# Patient Record
Sex: FEMALE | Race: BLACK OR AFRICAN AMERICAN | Employment: FULL TIME | URBAN - METROPOLITAN AREA
[De-identification: names, ages, dates, MRNs, and addresses within clinical notes are randomized per-mention and may not be internally consistent; named-entity substitution may affect disease eponyms.]

---

## 2017-04-11 ENCOUNTER — OP HISTORICAL/CONVERTED ENCOUNTER (OUTPATIENT)
Dept: OTHER | Age: 52
End: 2017-04-11

## 2017-04-20 ENCOUNTER — OP HISTORICAL/CONVERTED ENCOUNTER (OUTPATIENT)
Dept: OTHER | Age: 52
End: 2017-04-20

## 2017-06-27 ENCOUNTER — ED HISTORICAL/CONVERTED ENCOUNTER (OUTPATIENT)
Dept: OTHER | Age: 52
End: 2017-06-27

## 2017-12-13 ENCOUNTER — OP HISTORICAL/CONVERTED ENCOUNTER (OUTPATIENT)
Dept: OTHER | Age: 52
End: 2017-12-13

## 2018-09-07 ENCOUNTER — OP HISTORICAL/CONVERTED ENCOUNTER (OUTPATIENT)
Dept: OTHER | Age: 53
End: 2018-09-07

## 2019-03-11 ENCOUNTER — ED HISTORICAL/CONVERTED ENCOUNTER (OUTPATIENT)
Dept: OTHER | Age: 54
End: 2019-03-11

## 2019-03-13 ENCOUNTER — OP HISTORICAL/CONVERTED ENCOUNTER (OUTPATIENT)
Dept: OTHER | Age: 54
End: 2019-03-13

## 2019-08-05 ENCOUNTER — ED HISTORICAL/CONVERTED ENCOUNTER (OUTPATIENT)
Dept: OTHER | Age: 54
End: 2019-08-05

## 2019-12-20 ENCOUNTER — OP HISTORICAL/CONVERTED ENCOUNTER (OUTPATIENT)
Dept: OTHER | Age: 54
End: 2019-12-20

## 2020-01-22 ENCOUNTER — ED HISTORICAL/CONVERTED ENCOUNTER (OUTPATIENT)
Dept: OTHER | Age: 55
End: 2020-01-22

## 2020-01-28 ENCOUNTER — OP HISTORICAL/CONVERTED ENCOUNTER (OUTPATIENT)
Dept: OTHER | Age: 55
End: 2020-01-28

## 2020-03-03 ENCOUNTER — OP HISTORICAL/CONVERTED ENCOUNTER (OUTPATIENT)
Dept: OTHER | Age: 55
End: 2020-03-03

## 2020-03-03 ENCOUNTER — ED HISTORICAL/CONVERTED ENCOUNTER (OUTPATIENT)
Dept: OTHER | Age: 55
End: 2020-03-03

## 2020-03-05 ENCOUNTER — OP HISTORICAL/CONVERTED ENCOUNTER (OUTPATIENT)
Dept: OTHER | Age: 55
End: 2020-03-05

## 2020-06-17 ENCOUNTER — OP HISTORICAL/CONVERTED ENCOUNTER (OUTPATIENT)
Dept: OTHER | Age: 55
End: 2020-06-17

## 2020-07-02 ENCOUNTER — OP HISTORICAL/CONVERTED ENCOUNTER (OUTPATIENT)
Dept: OTHER | Age: 55
End: 2020-07-02

## 2020-08-01 ENCOUNTER — OP HISTORICAL/CONVERTED ENCOUNTER (OUTPATIENT)
Dept: OTHER | Age: 55
End: 2020-08-01

## 2020-08-03 ENCOUNTER — OP HISTORICAL/CONVERTED ENCOUNTER (OUTPATIENT)
Dept: OTHER | Age: 55
End: 2020-08-03

## 2020-08-25 ENCOUNTER — HOSPITAL ENCOUNTER (OUTPATIENT)
Dept: GENERAL RADIOLOGY | Age: 55
Discharge: HOME | End: 2020-08-25
Attending: ORTHOPAEDIC SURGERY
Payer: COMMERCIAL

## 2020-08-25 DIAGNOSIS — M75.02 ADHESIVE BURSITIS OF LEFT SHOULDER: ICD-10-CM

## 2020-08-25 PROCEDURE — 74011000250 HC RX REV CODE- 250: Performed by: ORTHOPAEDIC SURGERY

## 2020-08-25 PROCEDURE — 20610 DRAIN/INJ JOINT/BURSA W/O US: CPT

## 2020-08-25 PROCEDURE — 74011250636 HC RX REV CODE- 250/636: Performed by: ORTHOPAEDIC SURGERY

## 2020-08-25 PROCEDURE — 74011636320 HC RX REV CODE- 636/320: Performed by: ORTHOPAEDIC SURGERY

## 2020-08-25 RX ORDER — BUPIVACAINE HYDROCHLORIDE 5 MG/ML
5 INJECTION, SOLUTION EPIDURAL; INTRACAUDAL
Status: COMPLETED | OUTPATIENT
Start: 2020-08-25 | End: 2020-08-25

## 2020-08-25 RX ORDER — LIDOCAINE HYDROCHLORIDE 10 MG/ML
10 INJECTION, SOLUTION EPIDURAL; INFILTRATION; INTRACAUDAL; PERINEURAL
Status: COMPLETED | OUTPATIENT
Start: 2020-08-25 | End: 2020-08-25

## 2020-08-25 RX ORDER — TRIAMCINOLONE ACETONIDE 40 MG/ML
40 INJECTION, SUSPENSION INTRA-ARTICULAR; INTRAMUSCULAR
Status: COMPLETED | OUTPATIENT
Start: 2020-08-25 | End: 2020-08-25

## 2020-08-25 RX ADMIN — TRIAMCINOLONE ACETONIDE 40 MG: 40 INJECTION, SUSPENSION INTRA-ARTICULAR; INTRAMUSCULAR at 14:33

## 2020-08-25 RX ADMIN — BUPIVACAINE HYDROCHLORIDE 25 MG: 5 INJECTION, SOLUTION EPIDURAL; INTRACAUDAL at 14:33

## 2020-08-25 RX ADMIN — IOHEXOL 10 ML: 180 INJECTION INTRAVENOUS at 14:33

## 2020-08-25 RX ADMIN — LIDOCAINE HYDROCHLORIDE 5 ML: 10 INJECTION, SOLUTION EPIDURAL; INFILTRATION; INTRACAUDAL; PERINEURAL at 14:32

## 2020-09-01 ENCOUNTER — OP HISTORICAL/CONVERTED ENCOUNTER (OUTPATIENT)
Dept: OTHER | Age: 55
End: 2020-09-01

## 2020-09-02 ENCOUNTER — ED HISTORICAL/CONVERTED ENCOUNTER (OUTPATIENT)
Dept: OTHER | Age: 55
End: 2020-09-02

## 2020-09-04 VITALS
HEART RATE: 65 BPM | BODY MASS INDEX: 30.8 KG/M2 | OXYGEN SATURATION: 98 % | HEIGHT: 64 IN | RESPIRATION RATE: 18 BRPM | TEMPERATURE: 98.7 F | DIASTOLIC BLOOD PRESSURE: 70 MMHG | WEIGHT: 180.4 LBS | SYSTOLIC BLOOD PRESSURE: 120 MMHG

## 2020-09-04 PROBLEM — H66.90 INFECTION OF EAR: Status: ACTIVE | Noted: 2020-06-12

## 2020-09-04 PROBLEM — J34.9 SINUS PROBLEM: Status: ACTIVE | Noted: 2020-06-12

## 2020-09-04 PROBLEM — H92.09 OTALGIA: Status: ACTIVE | Noted: 2020-06-12

## 2020-09-04 RX ORDER — LOSARTAN POTASSIUM AND HYDROCHLOROTHIAZIDE 12.5; 1 MG/1; MG/1
1 TABLET ORAL DAILY
COMMUNITY
End: 2022-04-04

## 2020-09-04 RX ORDER — AZITHROMYCIN 250 MG/1
250 TABLET, FILM COATED ORAL DAILY
COMMUNITY
End: 2022-04-04

## 2020-09-04 RX ORDER — PREDNISONE 10 MG/1
TABLET ORAL
COMMUNITY
End: 2022-04-04

## 2020-09-04 RX ORDER — IBUPROFEN 800 MG/1
TABLET ORAL
COMMUNITY
End: 2022-04-04

## 2020-09-04 RX ORDER — PANTOPRAZOLE SODIUM 40 MG/1
40 TABLET, DELAYED RELEASE ORAL
COMMUNITY
End: 2022-04-05

## 2020-09-04 RX ORDER — METOPROLOL SUCCINATE 50 MG/1
TABLET, EXTENDED RELEASE ORAL DAILY
Status: ON HOLD | COMMUNITY
End: 2021-03-03 | Stop reason: SDUPTHER

## 2020-09-04 RX ORDER — METOPROLOL SUCCINATE 100 MG/1
100 TABLET, EXTENDED RELEASE ORAL
COMMUNITY

## 2020-09-04 RX ORDER — METHOCARBAMOL 750 MG/1
TABLET, FILM COATED ORAL 4 TIMES DAILY
COMMUNITY
End: 2022-04-04

## 2020-09-04 RX ORDER — LOSARTAN POTASSIUM 100 MG/1
100 TABLET ORAL DAILY
COMMUNITY
End: 2022-04-04

## 2020-09-04 RX ORDER — ALBUTEROL SULFATE 90 UG/1
AEROSOL, METERED RESPIRATORY (INHALATION)
COMMUNITY
End: 2022-04-04

## 2020-09-04 RX ORDER — OLMESARTAN MEDOXOMIL 20 MG/1
20 TABLET ORAL
COMMUNITY

## 2020-09-04 RX ORDER — LANOLIN ALCOHOL/MO/W.PET/CERES
400 CREAM (GRAM) TOPICAL DAILY
COMMUNITY
End: 2022-04-04

## 2020-09-04 RX ORDER — CEFDINIR 300 MG/1
300 CAPSULE ORAL 2 TIMES DAILY
COMMUNITY
End: 2022-04-04

## 2020-09-04 RX ORDER — SPIRONOLACTONE 25 MG/1
25 TABLET ORAL
COMMUNITY

## 2020-10-01 ENCOUNTER — OP HISTORICAL/CONVERTED ENCOUNTER (OUTPATIENT)
Dept: OTHER | Age: 55
End: 2020-10-01

## 2020-12-02 ENCOUNTER — TRANSCRIBE ORDER (OUTPATIENT)
Dept: SCHEDULING | Age: 55
End: 2020-12-02

## 2020-12-02 DIAGNOSIS — I10 BENIGN ESSENTIAL HYPERTENSION: Primary | ICD-10-CM

## 2020-12-02 DIAGNOSIS — I05.9 MITRAL VALVE DISORDER: ICD-10-CM

## 2020-12-02 DIAGNOSIS — R63.5 ABNORMAL WEIGHT GAIN: ICD-10-CM

## 2020-12-02 DIAGNOSIS — R14.0 ABDOMINAL BLOATING: ICD-10-CM

## 2020-12-03 ENCOUNTER — HOSPITAL ENCOUNTER (OUTPATIENT)
Dept: ULTRASOUND IMAGING | Age: 55
Discharge: HOME | End: 2020-12-03
Attending: FAMILY MEDICINE
Payer: COMMERCIAL

## 2020-12-03 DIAGNOSIS — I05.9 MITRAL VALVE DISORDER: ICD-10-CM

## 2020-12-03 DIAGNOSIS — I10 BENIGN ESSENTIAL HYPERTENSION: ICD-10-CM

## 2020-12-03 DIAGNOSIS — R14.0 ABDOMINAL BLOATING: ICD-10-CM

## 2020-12-03 DIAGNOSIS — R63.5 ABNORMAL WEIGHT GAIN: ICD-10-CM

## 2020-12-03 PROCEDURE — 76700 US EXAM ABDOM COMPLETE: CPT

## 2020-12-21 ENCOUNTER — OFFICE VISIT (OUTPATIENT)
Dept: PRIMARY CARE CLINIC | Age: 55
End: 2020-12-21

## 2020-12-21 VITALS — HEART RATE: 70 BPM | OXYGEN SATURATION: 98 % | TEMPERATURE: 98.6 F

## 2020-12-21 DIAGNOSIS — Z13.83 SCREENING FOR CARDIOVASCULAR, RESPIRATORY, AND GENITOURINARY DISEASES: Primary | ICD-10-CM

## 2020-12-21 DIAGNOSIS — Z13.6 SCREENING FOR CARDIOVASCULAR, RESPIRATORY, AND GENITOURINARY DISEASES: Primary | ICD-10-CM

## 2020-12-21 DIAGNOSIS — Z13.89 SCREENING FOR CARDIOVASCULAR, RESPIRATORY, AND GENITOURINARY DISEASES: Primary | ICD-10-CM

## 2020-12-21 DIAGNOSIS — R05.9 COUGH: ICD-10-CM

## 2020-12-21 PROCEDURE — 99202 OFFICE O/P NEW SF 15 MIN: CPT | Performed by: NURSE PRACTITIONER

## 2020-12-21 NOTE — PROGRESS NOTES
Mine Ramachandran is a 63 y.o. female who was seen in clinic today (12/21/2020) for an acute visit. Assessment/Plan:            * COVID-19 sample collected and submitted       * Patient given detailed CDC instructions contained within After Visit Summary    Diagnoses and all orders for this visit:    1. Screening for cardiovascular, respiratory, and genitourinary diseases  -     NOVEL CORONAVIRUS (COVID-19)    2. Cough       Covid like s/s with no known covid exposure. Discussed expected course/resolution/complications of diagnosis in detail with patient. Advised pt on CDC guidance, OTC medications for symptom management, red flags reviewed and should develop to seek emergency medical attn. Encouraged pt to maintain health through a balanced diet, high in fiber and plants;small freq meals if any nausea; staying well hydrated by drinking water or occ low sugar non carbonated beverages; reviewed importance of proper sleep habitus, 7-8hrs of rest; and emphasized moderate exercise 30 mins a day/150mins a week. Reviewed with her that COVID-19 pandemic is an evolving situation with rapidly changing recommendations & guidelines, continue to practice hand hygiene, social distancing, wearing of facial coverings. Regardless of testing results, should still follow CDC guidelines as there is a chance of a false negative, or symptoms may be due to a cold or flu which are also transmissible. Medical decisions are made based on the the best information available at the time. Recommended she stay tuned for updates published by trusted sources and to advise your PCP of any unexpected changes in clinical condition     Subjective:   Darlin Foster was seen today for Concern For COVID-19 (Coronavirus)  [employer] employee. She notes a mild productive cough, has been taking cough syrup with relief. She denies a recent history/current: loss of smell/taste, fever, wheezing, SOB, and BIRCH. Non user of tob.      Travel Screening Question   Response    In the last month, have you been in contact with someone who was confirmed or suspected to have Coronavirus / COVID-19? Yes    Have you had a COVID-19 viral test in the last 14 days? Do you have any of the following new or worsening symptoms? Fever;Cough;Muscle pain    Have you traveled internationally in the last month? No      Travel History   Travel since 11/21/20     No documented travel since 11/21/20          ROS - Pertinent items are noted in HPI    Patient Active Problem List   Diagnosis Code    Infection of ear H66.90    Sinus problem J34.9    Otalgia H92.09     Home Medications    Medication Sig Start Date End Date Taking? Authorizing Provider   albuterol (PROVENTIL HFA, VENTOLIN HFA, PROAIR HFA) 90 mcg/actuation inhaler Take  by inhalation. Provider, Historical   azithromycin (ZITHROMAX) 250 mg tablet Take 250 mg by mouth daily. Provider, Historical   cefdinir (OMNICEF) 300 mg capsule Take 300 mg by mouth two (2) times a day. Provider, Historical   ibuprofen (MOTRIN) 800 mg tablet Take  by mouth. Provider, Historical   losartan (COZAAR) 100 mg tablet Take 100 mg by mouth daily. Provider, Historical   losartan-hydroCHLOROthiazide (HYZAAR) 100-12.5 mg per tablet Take 1 Tab by mouth daily. Provider, Historical   magnesium oxide (MAG-OX) 400 mg tablet Take 400 mg by mouth daily. Provider, Historical   methocarbamoL (ROBAXIN) 750 mg tablet Take  by mouth four (4) times daily. Provider, Historical   METOPROLOL SUCCINATE PO Take  by mouth. Provider, Historical   metoprolol succinate (TOPROL-XL) 100 mg tablet Take  by mouth daily. Provider, Historical   metoprolol succinate (TOPROL-XL) 50 mg XL tablet Take  by mouth daily. Provider, Historical   olmesartan (BENICAR) 20 mg tablet Take 20 mg by mouth daily. Provider, Historical   pantoprazole (PROTONIX) 40 mg tablet Take 40 mg by mouth daily.     Provider, Historical   predniSONE (DELTASONE) 10 mg tablet Take  by mouth daily (with breakfast). Provider, Historical   spironolactone (ALDACTONE) 25 mg tablet Take  by mouth daily. Provider, Historical      Allergies   Allergen Reactions    Erythromycin Base Other (comments)     severe    Hydrochlorothiazide Other (comments)     severe    Penicillin G Other (comments)    Zithromax Z-Joshua [Azithromycin] Other (comments)          Objective:   Physical Exam  General:  alert, cooperative, no distress   Ears: Normal external ear canals AU   Neck: supple, symmetrical, trachea midline. Heart: normal rate, regular rhythm, normal S1, S2, no murmurs, rubs, clicks or gallops. Lungs: clear to auscultation bilaterally       Visit Vitals  Pulse 70   Temp 98.6 °F (37 °C)   SpO2 98%         Disclaimer:        Medication risks/benefits/costs/interactions/alternatives discussed with patient. She was given an after visit summary which includes diagnoses, current medications, & vitals. She expressed understanding with the diagnosis and plan. Aspects of this note may have been generated using voice recognition software. Despite editing, there may be some syntax errors.        Anne Marie Fuentes NP

## 2020-12-22 LAB — SARS-COV-2, NAA: DETECTED

## 2020-12-23 ENCOUNTER — TELEPHONE (OUTPATIENT)
Dept: PRIMARY CARE CLINIC | Age: 55
End: 2020-12-23

## 2020-12-30 ENCOUNTER — OFFICE VISIT (OUTPATIENT)
Dept: PRIMARY CARE CLINIC | Age: 55
End: 2020-12-30
Payer: COMMERCIAL

## 2020-12-30 VITALS — HEART RATE: 78 BPM | OXYGEN SATURATION: 97 % | TEMPERATURE: 98.4 F

## 2020-12-30 DIAGNOSIS — Z86.16 HISTORY OF 2019 NOVEL CORONAVIRUS DISEASE (COVID-19): Primary | ICD-10-CM

## 2020-12-30 DIAGNOSIS — Z76.89 RETURN TO WORK EVALUATION: ICD-10-CM

## 2020-12-30 DIAGNOSIS — R05.9 COUGH: ICD-10-CM

## 2020-12-30 PROCEDURE — 99212 OFFICE O/P EST SF 10 MIN: CPT | Performed by: NURSE PRACTITIONER

## 2020-12-31 NOTE — PROGRESS NOTES
Shannan Ibrahim is a 63 y.o. female who was seen in clinic today (12/30/2020) for an acute visit. Assessment/Plan:            * Patient given detailed CDC instructions contained within After Visit Summary    Diagnoses and all orders for this visit:    1. History of 2019 novel coronavirus disease (COVID-19)    2. Cough    3. Return to work evaluation    Other orders  -     DROPLET PLUS; Standing       known covid-19. Patient has met Centers for Disease Control symptom based criteria for no longer being contagious and ending quarantine. Additionally, she feels able to return to work. Based on my exam, I believe patient may return to work safely. Reviewed with her that COVID-19 pandemic is an evolving situation with rapidly changing recommendations & guidelines, continue to practice hand hygiene, social distancing, wearing of facial coverings; re-infections with covid-19 have been reported although risk remains low. Medical decisions are made based on the the best information available at the time. Recommended she stay tuned for updates published by trusted sources such as the CDC/[facility] websites and to advise your PCP of any unexpected changes in clinical condition     Subjective:   Cristiana Hope was seen today for return to work following lower resp tract infection due to covid-19. Was initially diagnosed with covid 19 on 12/21/2020.,  She denies a recent history/current:, fever, wheezing, SOB, and BIRCH. Feeling mostly back to baseline, would like note to return to work. Travel Screening     Question   Response    In the last month, have you been in contact with someone who was confirmed or suspected to have Coronavirus / COVID-19? Yes    Have you had a COVID-19 viral test in the last 14 days? Yes - Positive result    Do you have any of the following new or worsening symptoms? None of these    Have you traveled internationally in the last month?   No      Travel History   Travel since 11/30/20     No documented travel since 11/30/20          ROS - Pertinent items are noted in HPI    Patient Active Problem List   Diagnosis Code    Infection of ear H66.90    Sinus problem J34.9    Otalgia H92.09     Home Medications    Medication Sig Start Date End Date Taking? Authorizing Provider   albuterol (PROVENTIL HFA, VENTOLIN HFA, PROAIR HFA) 90 mcg/actuation inhaler Take  by inhalation. Provider, Historical   azithromycin (ZITHROMAX) 250 mg tablet Take 250 mg by mouth daily. Provider, Historical   cefdinir (OMNICEF) 300 mg capsule Take 300 mg by mouth two (2) times a day. Provider, Historical   ibuprofen (MOTRIN) 800 mg tablet Take  by mouth. Provider, Historical   losartan (COZAAR) 100 mg tablet Take 100 mg by mouth daily. Provider, Historical   losartan-hydroCHLOROthiazide (HYZAAR) 100-12.5 mg per tablet Take 1 Tab by mouth daily. Provider, Historical   magnesium oxide (MAG-OX) 400 mg tablet Take 400 mg by mouth daily. Provider, Historical   methocarbamoL (ROBAXIN) 750 mg tablet Take  by mouth four (4) times daily. Provider, Historical   METOPROLOL SUCCINATE PO Take  by mouth. Provider, Historical   metoprolol succinate (TOPROL-XL) 100 mg tablet Take  by mouth daily. Provider, Historical   metoprolol succinate (TOPROL-XL) 50 mg XL tablet Take  by mouth daily. Provider, Historical   olmesartan (BENICAR) 20 mg tablet Take 20 mg by mouth daily. Provider, Historical   pantoprazole (PROTONIX) 40 mg tablet Take 40 mg by mouth daily. Provider, Historical   predniSONE (DELTASONE) 10 mg tablet Take  by mouth daily (with breakfast). Provider, Historical   spironolactone (ALDACTONE) 25 mg tablet Take  by mouth daily.     Provider, Historical      Allergies   Allergen Reactions    Erythromycin Base Other (comments)     severe    Hydrochlorothiazide Other (comments)     severe    Penicillin G Other (comments)    Zithromax Z-Joshua [Azithromycin] Other (comments)          Objective: Physical Exam  General:   alert, cooperative, no distress   Ears: Normal external ear canals AU   Neck: supple, symmetrical, trachea midline. Heart: normal rate, regular rhythm   Lungs: clear to auscultation bilaterally       Visit Vitals  Pulse 78   Temp 98.4 °F (36.9 °C)   SpO2 97%         Disclaimer:        Medication risks/benefits/costs/interactions/alternatives discussed with patient. She was given an after visit summary which includes diagnoses, current medications, & vitals. She expressed understanding with the diagnosis and plan. Aspects of this note may have been generated using voice recognition software. Despite editing, there may be some syntax errors.        Frank Sommers NP

## 2021-02-27 ENCOUNTER — HOSPITAL ENCOUNTER (OUTPATIENT)
Dept: LAB | Age: 56
Discharge: HOME | End: 2021-02-27
Payer: COMMERCIAL

## 2021-02-27 ENCOUNTER — TRANSCRIBE ORDER (OUTPATIENT)
Dept: REGISTRATION | Age: 56
End: 2021-02-27

## 2021-02-27 DIAGNOSIS — U07.1 COVID-19: Primary | ICD-10-CM

## 2021-02-27 DIAGNOSIS — U07.1 COVID-19: ICD-10-CM

## 2021-02-27 PROCEDURE — U0003 INFECTIOUS AGENT DETECTION BY NUCLEIC ACID (DNA OR RNA); SEVERE ACUTE RESPIRATORY SYNDROME CORONAVIRUS 2 (SARS-COV-2) (CORONAVIRUS DISEASE [COVID-19]), AMPLIFIED PROBE TECHNIQUE, MAKING USE OF HIGH THROUGHPUT TECHNOLOGIES AS DESCRIBED BY CMS-2020-01-R: HCPCS

## 2021-02-28 LAB — SARS-COV-2, COV2NT: NOT DETECTED

## 2021-03-03 ENCOUNTER — ANESTHESIA (OUTPATIENT)
Dept: ENDOSCOPY | Age: 56
End: 2021-03-03
Payer: COMMERCIAL

## 2021-03-03 ENCOUNTER — ANESTHESIA EVENT (OUTPATIENT)
Dept: ENDOSCOPY | Age: 56
End: 2021-03-03
Payer: COMMERCIAL

## 2021-03-03 ENCOUNTER — HOSPITAL ENCOUNTER (OUTPATIENT)
Age: 56
Setting detail: OUTPATIENT SURGERY
Discharge: HOME | End: 2021-03-03
Attending: INTERNAL MEDICINE | Admitting: INTERNAL MEDICINE
Payer: COMMERCIAL

## 2021-03-03 VITALS
TEMPERATURE: 98.8 F | BODY MASS INDEX: 30.75 KG/M2 | OXYGEN SATURATION: 99 % | HEART RATE: 92 BPM | DIASTOLIC BLOOD PRESSURE: 65 MMHG | HEIGHT: 64 IN | SYSTOLIC BLOOD PRESSURE: 113 MMHG | RESPIRATION RATE: 13 BRPM | WEIGHT: 180.12 LBS

## 2021-03-03 LAB
H PYLORI FROM TISSUE: NEGATIVE
KIT LOT NO., HCLOLOT: NORMAL
NEGATIVE CONTROL: NEGATIVE
POSITIVE CONTROL: POSITIVE

## 2021-03-03 PROCEDURE — 2709999900 HC NON-CHARGEABLE SUPPLY: Performed by: INTERNAL MEDICINE

## 2021-03-03 PROCEDURE — 88305 TISSUE EXAM BY PATHOLOGIST: CPT

## 2021-03-03 PROCEDURE — 76060000031 HC ANESTHESIA FIRST 0.5 HR: Performed by: INTERNAL MEDICINE

## 2021-03-03 PROCEDURE — 76040000019: Performed by: INTERNAL MEDICINE

## 2021-03-03 PROCEDURE — 74011000250 HC RX REV CODE- 250: Performed by: ANESTHESIOLOGY

## 2021-03-03 PROCEDURE — 87077 CULTURE AEROBIC IDENTIFY: CPT | Performed by: INTERNAL MEDICINE

## 2021-03-03 PROCEDURE — 77030013992 HC SNR POLYP ENDOSC BSC -B: Performed by: INTERNAL MEDICINE

## 2021-03-03 PROCEDURE — 74011250636 HC RX REV CODE- 250/636: Performed by: NURSE ANESTHETIST, CERTIFIED REGISTERED

## 2021-03-03 PROCEDURE — 77030021593 HC FCPS BIOP ENDOSC BSC -A: Performed by: INTERNAL MEDICINE

## 2021-03-03 PROCEDURE — 74011000250 HC RX REV CODE- 250: Performed by: NURSE ANESTHETIST, CERTIFIED REGISTERED

## 2021-03-03 PROCEDURE — 74011250636 HC RX REV CODE- 250/636: Performed by: INTERNAL MEDICINE

## 2021-03-03 RX ORDER — DEXTROMETHORPHAN/PSEUDOEPHED 2.5-7.5/.8
1.2 DROPS ORAL
Status: DISCONTINUED | OUTPATIENT
Start: 2021-03-03 | End: 2021-03-03 | Stop reason: HOSPADM

## 2021-03-03 RX ORDER — PROPOFOL 10 MG/ML
INJECTION, EMULSION INTRAVENOUS AS NEEDED
Status: DISCONTINUED | OUTPATIENT
Start: 2021-03-03 | End: 2021-03-03 | Stop reason: HOSPADM

## 2021-03-03 RX ORDER — SODIUM CHLORIDE 9 MG/ML
50 INJECTION, SOLUTION INTRAVENOUS CONTINUOUS
Status: DISCONTINUED | OUTPATIENT
Start: 2021-03-03 | End: 2021-03-03 | Stop reason: HOSPADM

## 2021-03-03 RX ORDER — MIDAZOLAM HYDROCHLORIDE 1 MG/ML
.25-5 INJECTION INTRAMUSCULAR; INTRAVENOUS
Status: DISCONTINUED | OUTPATIENT
Start: 2021-03-03 | End: 2021-03-03 | Stop reason: HOSPADM

## 2021-03-03 RX ORDER — EPINEPHRINE 0.1 MG/ML
1 INJECTION INTRACARDIAC; INTRAVENOUS
Status: DISCONTINUED | OUTPATIENT
Start: 2021-03-03 | End: 2021-03-03 | Stop reason: HOSPADM

## 2021-03-03 RX ORDER — METOPROLOL TARTRATE 5 MG/5ML
5 INJECTION INTRAVENOUS ONCE
Status: COMPLETED | OUTPATIENT
Start: 2021-03-03 | End: 2021-03-03

## 2021-03-03 RX ORDER — PROPOFOL 10 MG/ML
INJECTION, EMULSION INTRAVENOUS
Status: DISCONTINUED | OUTPATIENT
Start: 2021-03-03 | End: 2021-03-03 | Stop reason: HOSPADM

## 2021-03-03 RX ORDER — NALOXONE HYDROCHLORIDE 0.4 MG/ML
0.4 INJECTION, SOLUTION INTRAMUSCULAR; INTRAVENOUS; SUBCUTANEOUS
Status: DISCONTINUED | OUTPATIENT
Start: 2021-03-03 | End: 2021-03-03 | Stop reason: HOSPADM

## 2021-03-03 RX ORDER — LIDOCAINE HYDROCHLORIDE 20 MG/ML
INJECTION, SOLUTION EPIDURAL; INFILTRATION; INTRACAUDAL; PERINEURAL AS NEEDED
Status: DISCONTINUED | OUTPATIENT
Start: 2021-03-03 | End: 2021-03-03 | Stop reason: HOSPADM

## 2021-03-03 RX ORDER — PANTOPRAZOLE SODIUM 40 MG/1
40 TABLET, DELAYED RELEASE ORAL DAILY
Qty: 90 TAB | Refills: 0 | Status: SHIPPED | OUTPATIENT
Start: 2021-03-03 | End: 2021-06-01

## 2021-03-03 RX ORDER — ATROPINE SULFATE 0.1 MG/ML
0.5 INJECTION INTRAVENOUS
Status: DISCONTINUED | OUTPATIENT
Start: 2021-03-03 | End: 2021-03-03 | Stop reason: HOSPADM

## 2021-03-03 RX ORDER — FLUMAZENIL 0.1 MG/ML
0.2 INJECTION INTRAVENOUS
Status: DISCONTINUED | OUTPATIENT
Start: 2021-03-03 | End: 2021-03-03 | Stop reason: HOSPADM

## 2021-03-03 RX ADMIN — PROPOFOL 140 MCG/KG/MIN: 10 INJECTION, EMULSION INTRAVENOUS at 08:21

## 2021-03-03 RX ADMIN — PROPOFOL 30 MG: 10 INJECTION, EMULSION INTRAVENOUS at 08:24

## 2021-03-03 RX ADMIN — PROPOFOL 70 MG: 10 INJECTION, EMULSION INTRAVENOUS at 08:21

## 2021-03-03 RX ADMIN — PROPOFOL 30 MG: 10 INJECTION, EMULSION INTRAVENOUS at 08:22

## 2021-03-03 RX ADMIN — LIDOCAINE HYDROCHLORIDE 20 MG: 20 INJECTION, SOLUTION INTRAVENOUS at 08:21

## 2021-03-03 RX ADMIN — SODIUM CHLORIDE 50 ML/HR: 9 INJECTION, SOLUTION INTRAVENOUS at 08:05

## 2021-03-03 RX ADMIN — METOPROLOL TARTRATE 5 MG: 5 INJECTION INTRAVENOUS at 08:00

## 2021-03-03 NOTE — DISCHARGE INSTRUCTIONS
[address] [facility] [address]  [address] [facility], [address] [facility]  [phone]                   [facility]  [id number]  1965    COLON DISCHARGE INSTRUCTIONS    DISCOMFORT:  Redness at IV site- apply warm compress to area; if redness or soreness persist- contact your physician  There may be a slight amount of blood passed from the rectum  Gaseous discomfort- walking, belching will help relieve any discomfort  You may not operate a vehicle for 12 hours  You may not  engage in an occupation involving machinery or appliances for rest of today  You may not  drink alcoholic beverages for at least 12 hours  Avoid making any critical decisions for at least 24 hour    DIET:   High fiber diet. - however -  remember your colon is empty and a heavy meal will produce gas. Avoid these foods:  vegetables, fried / greasy foods, carbonated drinks for today     ACTIVITY:  It is recommended that you spend the remainder of the day resting -  avoid any strenuous activity. CALL M.D. ANY SIGN OF:   Increasing pain, nausea, vomiting  Abdominal distension (swelling)  New increased bleeding (oral or rectal)  Fever (chills)  Pain in chest area  Bloody discharge from nose or mouth  Shortness of breath    You may resume your medications    Post procedure diagnosis:    Hiatal hernia  Esophagitis  descending colon and sigmoid polyps  hemorrhoids    Follow-up Instructions:    If a specimen was collected, you will receive a letter with the result by mail within two  weeks. Depending on the result this letter will specify your follow up colonoscopy date.       Please call us for any questions or concerns                     Kandace Hudson  [id number]  1965        DISCHARGE SUMMARY from Nurse    The following personal items collected during your admission are returned to you:   Dental Appliance: Dental Appliances: None  Vision: Visual Aid: None  Hearing Aid:    Jewelry:    Clothing:    Other Valuables: Valuables sent to safe:

## 2021-03-03 NOTE — PERIOP NOTES
Endoscopy discharge instructions have been reviewed and given to patient. The patient verbalized understanding and acceptance of instructions. Dr. Kelsi Gauthier discussed with patient and caregiver procedure findings and next steps.

## 2021-03-03 NOTE — PERIOP NOTES
Patient HR was elevated during pre-admission between 115-120s, Dr. Shelton Barnes (anesthesiologist), spoke with the patient, per the patient she did not take her metoprolol this morning and she was nervous, on why she believes her HR was elevated. Dr. Shelton Barnes ordered for the patient to get 5 mg of Metoprolol. Patient received 5 mg at 0800. HR was 112, and /78 at 0800. At 0810, patient HR came down to 98 and /79. Dr. Shelton Barnes is aware of the patients current heart rate. Will continue to monitor.

## 2021-03-03 NOTE — PROCEDURES
[address]  [address] [facility], [address] [facility]       [phone]                   3/3/2021                EGD Operative Report  Kandace Hudson  :  1965  Duy Mccloud Medical Record Number:  [id number]      Indication:  Abdominal pain, epigastric, GERD     : Gio Mary MD    Assistants: None    Referring Provider:  Aranza Connolly MD      Anesthesia/Sedation:  MAC anesthesia Propofol    Airway assessment: No airway problems anticipated    Pre-Procedural Exam:      Airway: clear, no airway problems anticipated  Heart: RRR, without gallops or rubs  Lungs: clear bilaterally without wheezes, crackles, or rhonchi  Abdomen: soft, nontender, nondistended, bowel sounds present  Mental Status: awake, alert and oriented to person, place and time       Procedure Details     After infomed consent was obtained for the procedure, with all risks and benefits of procedure explained the patient was taken to the endoscopy suite and placed in the left lateral decubitus position. Following sequential administration of sedation as per above, the endoscope was inserted into the mouth and advanced under direct vision to second portion of the duodenum. A careful inspection was made as the gastroscope was withdrawn, including a retroflexed view of the proximal stomach; findings and interventions are described below. Findings:   Esophagus: Mild grade A reflux type esophagitis. normal rest of the examined esophagus. Random bx taken. Stomach: medium size sliding hiatal hernia ( about 2-3 cm ). Otherwise stomach is normal. JOEL test performed   Duodenum/jejunum: normal mucosa. Bx taken for histology    Therapies:  biopsy of esophagus  biopsy of stomach body, antrum  biopsy of duodenal random    Specimens: none    Implants: none           Complications:   None; patient tolerated the procedure well. EBL:  None.            Impression:      Hiatal hernia  Grade A Esophagitis    Recommendations:    -Acid suppression with a proton pump inhibitor. ,   -Await JOEL test result and treat for Helicobacter pylori if positive. ,   -Follow symptoms and call for pathology results in 1 week  -GERD diet: avoid fried and fatty foods.  peppermint, chocolate, alcohol, coffee, citrus fruits and juices, tomoato products; avoid lying down for 2 to 3 hours after eating.,   -No NSAIDS  -Colonoscopy today    Christa Marmolejo MD

## 2021-03-03 NOTE — ANESTHESIA POSTPROCEDURE EVALUATION
Procedure(s):  ESOPHAGOGASTRODUODENOSCOPY (EGD)  COLONOSCOPY  ESOPHAGOGASTRODUODENAL (EGD) BIOPSY  ENDOSCOPIC POLYPECTOMY. MAC    Anesthesia Post Evaluation        Patient location during evaluation: PACU  Level of consciousness: awake  Pain management: adequate  Airway patency: patent  Anesthetic complications: no  Cardiovascular status: acceptable  Respiratory status: acceptable  Hydration status: acceptable  Post anesthesia nausea and vomiting:  none      INITIAL Post-op Vital signs:   Vitals Value Taken Time   /74 03/03/21 0851   Temp 37.1 °C (98.8 °F) 03/03/21 0848   Pulse 92 03/03/21 0854   Resp 18 03/03/21 0854   SpO2 100 % 03/03/21 0855   Vitals shown include unvalidated device data.

## 2021-03-03 NOTE — ANESTHESIA PREPROCEDURE EVALUATION
Relevant Problems   No relevant active problems       Anesthetic History   No history of anesthetic complications            Review of Systems / Medical History  Patient summary reviewed, nursing notes reviewed and pertinent labs reviewed    Pulmonary  Within defined limits                 Neuro/Psych   Within defined limits           Cardiovascular    Hypertension                Comments: Mitral valve prolapse   GI/Hepatic/Renal     GERD           Endo/Other  Within defined limits           Other Findings              Physical Exam    Airway  Mallampati: I  TM Distance: 4 - 6 cm  Neck ROM: normal range of motion   Mouth opening: Normal     Cardiovascular    Rhythm: regular  Rate: abnormal         Dental    Dentition: Lower dentition intact and Upper dentition intact     Pulmonary  Breath sounds clear to auscultation               Abdominal         Other Findings            Anesthetic Plan    ASA: 2  Anesthesia type: MAC          Induction: Intravenous  Anesthetic plan and risks discussed with: Patient      Tachycardic this morning, HR around 140, she appears anxious and also did not take her am dose of metoprolol. Will start with iv hydration and intravenous metoprolol in attempt to bring heart rate down.

## 2021-03-03 NOTE — PROCEDURES
[address] [facility] [address]  [address] [facility], [address] [facility]  [phone]                   Colonoscopy Operative Report      Indications:    Screening colonoscopy     :  Angelica Eubanks MD    Assistants: None    Referring Provider: Luís Galvez MD    Sedation:  MAC anesthesia Propofol    Procedure Details:  After informed consent was obtained with all risks and benefits of procedure explained and preoperative exam completed, the patient was taken to the endoscopy suite and placed in the left lateral decubitus position. Upon sequential sedation as per above, a digital rectal exam was performed  And was normal.  The Olympus videocolonoscope  was inserted in the rectum and carefully advanced to the cecum, which was identified by the ileocecal valve and appendiceal orifice, terminal ileum. The quality of preparation was excellent. The colonoscope was slowly withdrawn with careful evaluation between folds. Retroflexion in the rectum was performed and was normal..     Findings:   Rectum: Grade 1 internal hemorrhoid(s); Sigmoid: 1  Sessile polyp(s), the largest 3 mm in size; Descending Colon: 1  Sessile polyp(s), the largest 4 mm in size;  Transverse Colon: normal  Ascending Colon: normal  Cecum: normal  Terminal Ileum: normal    Interventions:  2 complete polypectomy were performed using cold biopsy forceps and the polyps were  retrieved    Specimen Removed:  specimen #1, 3 mm in size, located in the descending colon and the sigmoid removed by cold biopsy and sent for pathology    Implants: none    Complications: None. EBL:  None. Impression:        Diminutive descending colon and sigmoid polyps ( removed)   Grade 1 Internal Hemorrhoids        Recommendations:   -Await pathology.   -If adenoma is present, repeat colonoscopy in 5 years.  -High fiber diet.     -Resume normal medication(s)  -Hemorrhoid education handout    Discharge Disposition:  Home in the company of a  when able to ambulate.     Gio Mary MD  3/3/2021  8:44 AM

## 2021-03-03 NOTE — PERIOP NOTES
Received Report From Miguel Bob CRNA, @ 7787, see anesthesia notes. Care of the patient transferred to procedure nurse Brigida Melchor, BEATRIZ @ 7624    Out of Procedure and sent to post-recovery @ 3510    Post-recovery report given to Germán Paulson RN @ 4533    Patient ABD remains soft and non-tender post procedure. Pt has no complaints at this time and tolerated the procedure well. Endoscope was pre-cleaned at bedside immediately following procedure by Richy Stevenson.

## 2021-03-03 NOTE — PERIOP NOTES
German Shed  1965  [id number]        Situation:  Verbal report received from: Lucy Parsons RN. Procedure: Procedure(s):  ESOPHAGOGASTRODUODENOSCOPY (EGD)  COLONOSCOPY  ESOPHAGOGASTRODUODENAL (EGD) BIOPSY  ENDOSCOPIC POLYPECTOMY    Background:    Preoperative diagnosis: ABDOMINAL PAIN, SCREENING  Postoperative diagnosis:   Hiatal hernia  Esophagitis  descending colon and sigmoid polyps  hemorrhoids    :  Dr. Jones Crocker  Assistant(s): Endoscopy Technician-1: Arnold Sim  Endoscopy RN-1: Ellen Lala RN    Specimens:   ID Type Source Tests Collected by Time Destination   1 : duodenal biopsy Preservative Duodenum  Kasey Alberto MD 3/3/2021 0825 Pathology   2 : Random esophagus biopsy Preservative   Kasey Alberto MD 3/3/2021 0825 Pathology   3 : descending colon and sigmoid polyps Preservative   Kasey Alberto MD 3/3/2021 1524 Pathology     H. Pylori  yes      Anesthesia gave intra-procedure sedation and medications, see anesthesia flow sheet yes    Intravenous fluids: NS@ KVO     Vital signs stable yes    Abdominal assessment: round and soft yes     Recommendation:  Discharge patient per MD Susanna Field .   \Family or Friend - Irena Mary   Permission to share finding with family or friend yes

## 2021-03-03 NOTE — H&P
[address] [place] [address]  [facility], [address] [facility]  [phone]                     History and Physical     NAME: Nguyen Pierce   :  1965   MRN:  [id number]     HPI:  63year old female who presents with a complaint of Abdominal Pain. The patient presents for consultation at the request of Dr. Edwin Song). The onset of the abdominal pain has been chronic and has been occurring in an intermittent pattern for months with a recurrent course . The abdominal pain is described as moderate burning and pressure sensation and  is described as being located in the right upper quadrant .  The abdominal pain radiates to the right upper quadrant. The symptoms are aggravated by meals (1/2 to 1 hour after eating). The symptoms are relieved by fasting. The symptoms have been associated with bloating and chest pain,  while the symptoms have not been associated with bloody stools, black stools, constipation, diarrhea, family history of colon cancer, heartburn, hematuria, jaundice, melena, nausea, passing worms, use of alcohol, vomiting or weight loss. There is a surgical history of cholecystectomy and hysterectomy. Previous diagnostic tests have included ultrasound (normal within weeks) and Liver function tests (mildldy elevated ALT. Fatty liver foudn on US TP is s/p choelcystectomy), but not colonoscopy, EGD, CT scan or Gastric emptying study. The patient has been using omeprazole (Prilosec). Current medication use:  not considered effective by patient. The abdominal pain is characterized as not well controlled. Lab results show: CBC normal and LFT's (ALT mildly elevated. ). Additional reasons for visit:    Screening Colonoscopy is described as the following:   There has been no associated family history of colon cancer, change in bowel habits, change in caliber of stools, melena, hematochezia, weight loss, anorexia, diarrhea, constipation, fever, feeling tired, abdominal pain or family history of colon polyps. The frequency of bowel movements has been 1 per day and The stools are of normal consistency. Lab results: CBC normal. Previous diagnostic tests have not included colonoscopy or CT scan. Past Surgical History:   Procedure Laterality Date    HX BREAST RECONSTRUCTION      implants    HX CHOLECYSTECTOMY      HX HYSTERECTOMY      still has ovaries    [place] BREAST SURGERY PROCEDURE UNLISTED      fibrominoma removed     Past Medical History:   Diagnosis Date    Fatty liver     GERD (gastroesophageal reflux disease)     Hypertension     Infection of ear 6/12/2020    Mitral valve prolapse     Otalgia 6/12/2020    Sinus problem 9/4/2020     Social History     Tobacco Use    Smoking status: Never Smoker    Smokeless tobacco: Never Used   Substance Use Topics    Alcohol use: Not Currently    Drug use: Not on file     Allergies   Allergen Reactions    Erythromycin Base Other (comments)     severe    Hydrochlorothiazide Other (comments)     severe    Lisinopril Rash    Morphine Unknown (comments)    Penicillin G Other (comments)    Zithromax Z-Joshua [Azithromycin] Other (comments)     History reviewed. No pertinent family history. No current facility-administered medications for this encounter. PHYSICAL EXAM:  General: WD, WN. Alert, cooperative, no acute distress    HEENT: NC, Atraumatic. PERRLA, EOMI. Anicteric sclerae. Lungs:  CTA Bilaterally. No Wheezing/Rhonchi/Rales. Heart:  Regular  rhythm,  No murmur, No Rubs, No Gallops  Abdomen: Soft, Non distended, Non tender.  +Bowel sounds, no HSM  Extremities: No c/c/e  Neurologic:  CN 2-12 gi, Alert and oriented X 3. No acute neurological distress   Psych:   Good insight. Not anxious nor agitated.            Assessment:   I have reviewed with the patient +/- family alternatives,benefits and risks for the procedure, as well as potential complications(with emphasis on, but not limited to, bleeding, perforation, cardiovascular/cerebrovascular/pulmonary events, reactions to the medications, infection, risk of missing a lesions/a cancer, and the imponderables including death), alternative options, and patient/family voices understanding.       Plan:   · Endoscopic procedure  · Conscious sedation or MAC

## 2021-09-02 ENCOUNTER — TRANSCRIBE ORDER (OUTPATIENT)
Dept: SCHEDULING | Age: 56
End: 2021-09-02

## 2021-09-02 DIAGNOSIS — Z12.31 VISIT FOR SCREENING MAMMOGRAM: Primary | ICD-10-CM

## 2021-10-01 ENCOUNTER — TRANSCRIBE ORDER (OUTPATIENT)
Dept: SCHEDULING | Age: 56
End: 2021-10-01

## 2021-10-01 DIAGNOSIS — Z12.31 SCREENING MAMMOGRAM FOR HIGH-RISK PATIENT: Primary | ICD-10-CM

## 2022-01-04 ENCOUNTER — HOSPITAL ENCOUNTER (OUTPATIENT)
Dept: PHYSICAL THERAPY | Age: 57
Discharge: HOME | End: 2022-01-04
Payer: COMMERCIAL

## 2022-01-04 PROCEDURE — 97162 PT EVAL MOD COMPLEX 30 MIN: CPT

## 2022-01-04 PROCEDURE — 97110 THERAPEUTIC EXERCISES: CPT

## 2022-01-04 NOTE — PROGRESS NOTES
[address] [address] [facility] [address] Suite [facility], [address] [place] [address]  Ph: [phone]    Fax: [phone]    Initial Evaluation/Plan of Care/Statement of Necessity for Physical Therapy Services     Patient name: Kiarra Laboy    Date/Start of Care:2022  : 1965  [x]  Patient  Verified  Provider#: [id number]          Referral source: [place] Patient, *    Return visit to MD: in two months      Medical/Treatment Diagnosis: Pain in right shoulder [M25.511]  Impingement syndrome of right shoulder [M75.41]  Other shoulder lesions, right shoulder [M75.81]  Impingement syndrome of left shoulder [M75.42]  Other shoulder lesions, left shoulder [M75.82]  Pain in left shoulder [M25.512]    Payor: [employer] (formerly [employer]) / Plan: [facility] PPO / Product Type: PPO /       Prior Hospitalization: see medical history     Comorbidities: see intake summary   Prior Level of Function: Indep   Medications: Verified on Patient Summary List          Patient / Family readiness to learn indicated by: asking questions, trying to perform skills and interest  Persons(s) to be included in education: patient (P)  Barriers to Learning/Limitations: None  Patient Self Reported Health Status: good  Rehabilitation Potential: good  Previous Treatment/Compliance: yes   PMHx/Surgical Hx: see intake summary   Work Hx: Pt works as a Mammographer supervisor, desk job   Living Situation: Pt lives with daughter in a two story home   Barriers to progress: none   Motivation: good   Substance use: none   Cognition: A & O x 4  Onset Date: 21    SUBJECTIVE  Mechanism of Injury: Gradual onset of shoulder pain about 4-5 months ago. Pt states both shoulders started hurting around the same time but the R shoulder was more intense. Pt states the whole R shoulder girdle started to hurt so she went to the doctor.   She had an injection about two months ago in the R shoulder which helped with the muscle soreness. Pain still comes and goes in both shoulder, R>L. Area of pain: Bilat shoulder pain (R>L)    Pain Descriptors: Throbbing pain    Pain Level (0-10 scale)  At rest: 0 With activity: 8  Worst: 8   Least: 0  Things that worsen pain: Reaching away from body, out to side, overhead and behind the back. Things that ease pain: rest, ice   Objective/Functional Measures including ROM/MMT:   Physical Findings   Ortho:   Posture: Rounded shoulders with slight R scapula ant tipping. Palpation: TTP R pecs, subacromial space and humeral head. Decrease GH posterior mobility   Gross findings:  Poor postural control     Specific joints: *normal values in ()    SHOULDER                                         AROM   PROM            MMT   R L R L R L   Flexion (180) 133 P! 145   4 4+   Extension (60) 52 65       Abduction (180) 110 P! 135   4 P!  4+   Adduction (0)         IR (70) L2 P! T8    4+ 4+   ER (90) T2 P!  T3    4 4+   Horizontal Abduction (130)         Horizontal Adduction (45)         Additional comments: painful arc on L at 130-140 deg flexion    ELBOW                             AROM                             PROM                             MMT   R L R L R L   Flexion (150) WFL  WFL       Extension (0) [facility]  WFL        Additional comments: no pain       SPINE:   CERVICAL                                                ROM                                  Strength   Flexion  WFL    Extension WFL    Protraction     Retraction        R L R L   Lateral Flexion (45) WFL WFL     Rotation (90) WFL WFL     Additional comments: cervical ROM WFL without pain     Strength: good, non-painful   Special Tests: +impingement sign on R, +R empty can, +painful arc of motion on L, +R javed-meera test   [facility] Score:   24.91%  ASSESSMENT/Changes in Function:   Pt referred to PT services with shanika shoulder pain (R>L).   Pt presents with S&S associated with secondary impingement due to poor postural control, soft tissue tightness and decrease scapula stabilization. Pt will benefit from skilled PT services to assist in pain reduction. Problem List/Impairments: pain affecting function, decrease ROM, decrease strength, decrease activity tolerance and decrease flexibility/ joint mobility  Treatment Plan may include any combination of the following: Therapeutic exercise, Physical agent/modality, Manual therapy and Patient education  Patient/ Caregiver education and instruction: exercises  Frequency / Duration: Patient to be seen 2 times per week for 12 treatments. Certification Period: 1/4/22 - 4/4/22  Patient Goal (s): Uriah Arora    [] Met [] Not met [] Partially met   Short Term Goals: To be accomplished in 6 treatments. 1.  Indep with a HEP to assist in rehab progression. [] Met [] Not met [] Partially met    2. >150 deg bilat shoulder flexion ROM to improve overhead reach. [] Met [] Not met [] Partially met    3. >120 deg R shoulder abduction ROM to improve reaching out away from the body. [] Met [] Not met [] Partially met    4. Improvement in IR ROM behind the back to manage clothing behind the back. [] Met [] Not met [] Partially met    5. Max R shoulder pain 5/10 to improve overall activity tolerance. [] Met [] Not met [] Partially met    Long Term Goals: To be accomplished in 12 treatments. 1.  Pt will be able to reach overhead to retrieve objects without bilat shoulder pain. [] Met [] Not met [] Partially met    2. Pt will be able to sleep on her sides without shoulder pain to improve her quality of sleep. [] Met [] Not met [] Partially met    3. Pt will show no signs of impingement to improve ability to reach in different planes of motion without shoulder pain.  [] Met [] Not met [] Partially met    TODAY'S TREATMENT: EVALUATION completed   Visit #: 1  In time:3:20pm  Out time: 4:00pm  Total Treatment Time (min): 40   Total Timed Codes (min): 15 1:1 Treatment Time ([facility] only): 15   Pain Level (0-10 scale) pre treatment: 0   Pain Level (0-10 scale) post treatment: 0  15 min Therapeutic Exercise:  [x] See flow sheet :   Rationale: increase ROM and improve posture  to improve the patients ability to reach without shoulder pain   With   [x] TE   [] TA   [] Neuro   [] SC   [] other: Patient Education: [x] Review HEP    [] Progressed/Changed HEP based on:   [] positioning   [] body mechanics   [] transfers   [x] heat/ice application    [x] other: discussed POC        [x]  Plan of care has been reviewed with PTA. The Plan of Care is based on information from the initial evaluation. Aye Javed, PT, DPT 1/4/2022   ________________________________________________________________________    I certify that the above Therapy Services are being furnished while the patient is under my care. I agree with the treatment plan and certify that this therapy is necessary.     [de-identified] Signature:_________________________________________________  Date:____________Time: ____________     Renetta Jesus, *

## 2022-01-06 ENCOUNTER — HOSPITAL ENCOUNTER (OUTPATIENT)
Dept: PHYSICAL THERAPY | Age: 57
Discharge: HOME | End: 2022-01-06
Payer: COMMERCIAL

## 2022-01-06 PROCEDURE — 97110 THERAPEUTIC EXERCISES: CPT

## 2022-01-06 NOTE — PROGRESS NOTES
PT DAILY TREATMENT NOTE - [facility]     Patient Name: Xin Reading  Date:2022  : 1965  [x]  Patient  Verified  Payor: Janice  / Plan: [employer] [facility] [address] / Product Type: PPO /    Treatment Area: Pain in right shoulder [M25.511]  Impingement syndrome of right shoulder [M75.41]  Other shoulder lesions, right shoulder [M75.81]  Impingement syndrome of left shoulder [M75.42]  Other shoulder lesions, left shoulder [M75.82]  Pain in left shoulder [M25.512]   Next MD APPT:  In time: 4:22   Out time: 5:00  Total Treatment Time (min):38  Total Timed Codes (min): 38  1:1 Treatment Time ( [address] only): 38   Visit #:   Visit count could not be calculated. Make sure you are using a visit which is associated with an episode. SUBJECTIVE  Pain Level (0-10 scale) pre treatment:  0/10  Pain Level (0-10 scale) post treatment: 0/10  Any medication changes, allergies to medications, adverse drug reactions, diagnosis change, or new procedure performed?:   [] No    [] Yes (see summary sheet for update)  Subjective functional status/changes:   [] No changes reported  Reports no pain today just soreness and occasional pain with certain movements if she trys to reach for something behind her. Stated she's been doing the exercises, using a heating pad  and reports some discomfort on R scapula and being TTP at Pectoralis muscles. OBJECTIVE  Decline modalities today      38 min Therapeutic Exercise:  [] See flow sheet :   Rationale: increase ROM, increase strength, improve coordination, improve balance and increase proprioception to improve the patients ability to reduce shoulder pain elevation.        With   [x] TE   [] TA   [] Neuro   [] SC   [] other: Patient Education: [] Review HEP    [] Progressed/Changed HEP based on:   [] positioning   [] body mechanics   [] transfers   [] Use of heat/ice    [] other:          Other Objective/Functional Measures:   POC was continues   - Patient reports some discomfort and arm pain on L>R with isometrics and IR/ER AROM in side lying. ASSESSMENT/Changes in Function:   Patient tolerated session well, appears to have more discomfort on L>R shoulder during exercises, reports to be compliant with exercises and was given HEP with isometrics. Reports tightness with post capsule stretch. Reports no pain post session. Patient will continue to benefit from skilled PT services to modify and progress therapeutic interventions, address functional mobility deficits, address ROM deficits, address strength deficits, analyze and address soft tissue restrictions, analyze and cue movement patterns, analyze and modify body mechanics/ergonomics and assess and modify postural abnormalities to attain remaining goals. GOALS/Progress towards goals:  Short Term Goals: To be accomplished in 6 treatments. 1.  Indep with a HEP to assist in rehab progression. []? Met []? Not met []? Partially met    2. >150 deg bilat shoulder flexion ROM to improve overhead reach. []? Met []? Not met []? Partially met    3. >120 deg R shoulder abduction ROM to improve reaching out away from the body. []? Met []? Not met []? Partially met    4. Improvement in IR ROM behind the back to manage clothing behind the back. []? Met []? Not met []? Partially met    5. Max R shoulder pain 5/10 to improve overall activity tolerance. []? Met []? Not met []? Partially met      Long Term Goals: To be accomplished in 12 treatments. 1.  Pt will be able to reach overhead to retrieve objects without bilat shoulder pain. []? Met []? Not met []? Partially met    2. Pt will be able to sleep on her sides without shoulder pain to improve her quality of sleep. []? Met []? Not met []? Partially met    3. Pt will show no signs of impingement to improve ability to reach in different planes of motion without shoulder pain. []? Met []? Not met []?  Partially met      PLAN  [x]  Upgrade activities as tolerated     [x]  Continue plan of care  []  Update interventions per flow sheet       []  Discharge due to:_  []  Other:_      Toña Chváez, PT, DPT 1/6/2022

## 2022-01-10 ENCOUNTER — HOSPITAL ENCOUNTER (OUTPATIENT)
Dept: PHYSICAL THERAPY | Age: 57
Discharge: HOME | End: 2022-01-10
Payer: COMMERCIAL

## 2022-01-10 PROCEDURE — 97110 THERAPEUTIC EXERCISES: CPT | Performed by: PHYSICAL THERAPIST

## 2022-01-10 PROCEDURE — 97140 MANUAL THERAPY 1/> REGIONS: CPT | Performed by: PHYSICAL THERAPIST

## 2022-01-10 NOTE — PROGRESS NOTES
PT DAILY TREATMENT NOTE - [facility]     Patient Name: Lydia Norton  Date:1/10/2022  : 1965  [x]  Patient  Verified  Payor: Chanel Farrell / Plan: [employer] [facility] [address] / Product Type: PPO /    Treatment Area: Pain in right shoulder [M25.511]  Impingement syndrome of right shoulder [M75.41]  Other shoulder lesions, right shoulder [M75.81]  Impingement syndrome of left shoulder [M75.42]  Other shoulder lesions, left shoulder [M75.82]  Pain in left shoulder [M25.512]   Next MD APPT:  In time: 245pm  Out time: 325 pm  Total Treatment Time (min): 40  Total Timed Codes (min): 40  1:1 Treatment Time ( only): 40  Visit #: 3/12  Visit count could not be calculated. Make sure you are using a visit which is associated with an episode. SUBJECTIVE  Pain Level (0-10 scale) pre treatment:  0/10  Pain Level (0-10 scale) post treatment: 0/10  Any medication changes, allergies to medications, adverse drug reactions, diagnosis change, or new procedure performed?:   [] No    [] Yes (see summary sheet for update)  Subjective functional status/changes:   [] No changes reported  Reports her shoulder feel pretty good unless she moves the wrong way. OBJECTIVE  Decline modalities today    10 min Manual Therapy: inferior and posterior GH joint mobilizations, PROM into ER and flexion, STM R UT and LS   Rationale: decrease pain, increase ROM, increase tissue extensibility and decrease trigger points to improve the patients ability to reduce pain with ADLs    30 min Therapeutic Exercise:  [x] See flow sheet :   Rationale: increase ROM, increase strength, improve coordination, improve balance and increase proprioception to improve the patients ability to reduce shoulder pain elevation.        With   [x] TE   [] TA   [] Neuro   [] SC   [] other: Patient Education: [x] Review HEP    [] Progressed/Changed HEP based on:   [] positioning   [] body mechanics   [] transfers   [] Use of heat/ice    [] other:          Other Objective/Functional Measures: Improved PROM following GH joint mobilizations      ASSESSMENT/Changes in Function:   Patient tolerated session well, requiring cueing to properly set scapula with exercises and to avoid elevation. Patient will continue to benefit from skilled PT services to modify and progress therapeutic interventions, address functional mobility deficits, address ROM deficits, address strength deficits, analyze and address soft tissue restrictions, analyze and cue movement patterns, analyze and modify body mechanics/ergonomics and assess and modify postural abnormalities to attain remaining goals. GOALS/Progress towards goals:  Short Term Goals: To be accomplished in 6 treatments. 1.  Indep with a HEP to assist in rehab progression. [x]? Met []? Not met []? Partially met    2. >150 deg bilat shoulder flexion ROM to improve overhead reach. []? Met []? Not met [x]? Partially met    3. >120 deg R shoulder abduction ROM to improve reaching out away from the body. []? Met []? Not met [x]? Partially met    4. Improvement in IR ROM behind the back to manage clothing behind the back. []? Met []? Not met []? Partially met    5. Max R shoulder pain 5/10 to improve overall activity tolerance. []? Met []? Not met [x]? Partially met      Long Term Goals: To be accomplished in 12 treatments. 1.  Pt will be able to reach overhead to retrieve objects without bilat shoulder pain. []? Met []? Not met []? Partially met    2. Pt will be able to sleep on her sides without shoulder pain to improve her quality of sleep. []? Met []? Not met []? Partially met    3. Pt will show no signs of impingement to improve ability to reach in different planes of motion without shoulder pain. []? Met []? Not met []?  Partially met      PLAN  [x]  Upgrade activities as tolerated     [x]  Continue plan of care  []  Update interventions per flow sheet       []  Discharge due to:_  []  Other:_      Jose Castro PT 1/10/2022

## 2022-01-13 ENCOUNTER — HOSPITAL ENCOUNTER (OUTPATIENT)
Dept: PHYSICAL THERAPY | Age: 57
Discharge: HOME | End: 2022-01-13
Payer: COMMERCIAL

## 2022-01-13 PROCEDURE — 97110 THERAPEUTIC EXERCISES: CPT

## 2022-01-13 PROCEDURE — 97140 MANUAL THERAPY 1/> REGIONS: CPT

## 2022-01-13 NOTE — PROGRESS NOTES
PT DAILY TREATMENT NOTE - [facility]     Patient Name: Delon Yan  Date:2022  : 1965  [x]  Patient  Verified  Payor: Williams Casanova / Plan: BS\[facility]\"" EU[facility] [address] / Product Type: PPO /    Treatment Area: Pain in right shoulder [M25.511]  Impingement syndrome of right shoulder [M75.41]  Other shoulder lesions, right shoulder [M75.81]  Impingement syndrome of left shoulder [M75.42]  Other shoulder lesions, left shoulder [M75.82]  Pain in left shoulder [M25.512]   Next MD APPT:  In time: 3:47pm  Out time: 4:30pm  Total Treatment Time (min): 43  Total Timed Codes (min): 43  1:1 Treatment Time ( only): 43  Visit #:       SUBJECTIVE  Pain Level (0-10 scale) pre treatment:  0/10  Pain Level (0-10 scale) post treatment: 0/10  Any medication changes, allergies to medications, adverse drug reactions, diagnosis change, or new procedure performed?:   [] No    [] Yes (see summary sheet for update)  Subjective functional status/changes:   [] No changes reported  Pt states she is doing well just feel a little pain when moving certain ways. OBJECTIVE  Decline modalities today    10 min Manual Therapy: inferior and posterior GH joint mobilizations, PROM into ER and flexion, STM R UT and LS   Rationale: decrease pain, increase ROM, increase tissue extensibility and decrease trigger points to improve the patients ability to reduce pain with ADLs    33 min Therapeutic Exercise:  [x] See flow sheet :   Rationale: increase ROM, increase strength, improve coordination, improve balance and increase proprioception to improve the patients ability to reduce shoulder pain elevation. With   [x] TE   [] TA   [] Neuro   [] SC   [] other: Patient Education: [x] Review HEP    [] Progressed/Changed HEP based on:   [] positioning   [] body mechanics   [] transfers   [] Use of heat/ice    [] other:          Other Objective/Functional Measures:  Added elbow press behind head, ball roll on wall, cane shoulder flexions. ASSESSMENT/Changes in Function:   Pt continues to have soft tissue tightness along the R side neck and lateral border of the scapula. Cues to correct posture during theraband exercises. Occasional pain felt during ROM. Will continue to work on postural re-education and shoulder/scapula stabilization as tolerated. Progressing well at this time. Patient will continue to benefit from skilled PT services to modify and progress therapeutic interventions, address functional mobility deficits, address ROM deficits, address strength deficits, analyze and address soft tissue restrictions, analyze and cue movement patterns, analyze and modify body mechanics/ergonomics and assess and modify postural abnormalities to attain remaining goals. GOALS/Progress towards goals:  Short Term Goals: To be accomplished in 6 treatments. 1.  Indep with a HEP to assist in rehab progression. [x]? Met []? Not met []? Partially met    2. >150 deg bilat shoulder flexion ROM to improve overhead reach. []? Met []? Not met [x]? Partially met    3. >120 deg R shoulder abduction ROM to improve reaching out away from the body. []? Met []? Not met [x]? Partially met    4. Improvement in IR ROM behind the back to manage clothing behind the back. []? Met []? Not met []? Partially met    5. Max R shoulder pain 5/10 to improve overall activity tolerance. []? Met []? Not met [x]? Partially met      Long Term Goals: To be accomplished in 12 treatments. 1.  Pt will be able to reach overhead to retrieve objects without bilat shoulder pain. []? Met []? Not met []? Partially met    2. Pt will be able to sleep on her sides without shoulder pain to improve her quality of sleep. []? Met []? Not met []? Partially met    3. Pt will show no signs of impingement to improve ability to reach in different planes of motion without shoulder pain. []? Met []? Not met []?  Partially met      PLAN  [x]  Upgrade activities as tolerated     [x] Continue plan of care  []  Update interventions per flow sheet       []  Discharge due to:_  []  Other:_      Romana Bender, PT, DPT 1/13/2022

## 2022-01-24 ENCOUNTER — HOSPITAL ENCOUNTER (OUTPATIENT)
Dept: MAMMOGRAPHY | Age: 57
Discharge: HOME | End: 2022-01-24
Payer: COMMERCIAL

## 2022-01-24 ENCOUNTER — TRANSCRIBE ORDER (OUTPATIENT)
Dept: REGISTRATION | Age: 57
End: 2022-01-24

## 2022-01-24 DIAGNOSIS — Z12.31 VISIT FOR SCREENING MAMMOGRAM: ICD-10-CM

## 2022-01-24 DIAGNOSIS — Z12.31 VISIT FOR SCREENING MAMMOGRAM: Primary | ICD-10-CM

## 2022-01-24 PROCEDURE — 77063 BREAST TOMOSYNTHESIS BI: CPT

## 2022-01-25 ENCOUNTER — HOSPITAL ENCOUNTER (OUTPATIENT)
Dept: PHYSICAL THERAPY | Age: 57
Discharge: HOME | End: 2022-01-25
Payer: COMMERCIAL

## 2022-01-25 PROCEDURE — 97110 THERAPEUTIC EXERCISES: CPT

## 2022-01-25 NOTE — PROGRESS NOTES
PT DAILY TREATMENT NOTE - [facility]     Patient Name: Tg Dorado  Date:2022  : 1965  [x]  Patient  Verified  Payor: [place] [employer] / Plan: [facility] [facility] [address] / Product Type: PPO /    Treatment Area: Pain in right shoulder [M25.511]  Impingement syndrome of right shoulder [M75.41]  Other shoulder lesions, right shoulder [M75.81]  Impingement syndrome of left shoulder [M75.42]  Other shoulder lesions, left shoulder [M75.82]  Pain in left shoulder [M25.512]   Next MD APPT:  In time: 3:40pm   Out time: 4:35pm  Total Treatment Time (min): 55  Total Timed Codes (min): 40  1:1 Treatment Time ( only): 40  Visit #:       SUBJECTIVE  Pain Level (0-10 scale) pre treatment:  0/10  Pain Level (0-10 scale) post treatment: 0/10  Any medication changes, allergies to medications, adverse drug reactions, diagnosis change, or new procedure performed?:   [x] No    [] Yes (see summary sheet for update)  Subjective functional status/changes:   [] No changes reported  Pt states she is having a little more pain in the R shoulder today with certain movements.   No pain at rest.       OBJECTIVE  Modality rationale: increase tissue extensibility to improve the patients ability to move the arms around without shoulder pain    Min Type Additional Details       [] Estim: []Att   []Unatt    []TENS instruct                  []IFC  []Premod   []NMES                     []Other:  []w/US   []w/ice   []w/heat  Position:  Location:       []  Traction: [] Cervical       []Lumbar                       [] Prone          []Supine                       []Intermittent   []Continuous Lbs:  [] before manual  [] after manual  []w/heat    []  Ultrasound: []Continuous   [] Pulsed                       at: []1MHz   []3MHz Location:  W/cm2:    [] Paraffin         Location:   []w/heat   15 []  Ice     [x]  Heat  []  Ice massage Position: supine   Location: shanika shoulders     []  Laser  []  Other: Position:  Location:      [] Vasopneumatic Device Pressure:       [] lo [] med [] hi   Temperature:      [x] Skin assessment post-treatment:  [x]intact []redness- no adverse reaction    []redness - adverse reaction:       5 min Manual Therapy: R teres major/minor and shanika pec release   Rationale: decrease pain, increase ROM, increase tissue extensibility and decrease trigger points to improve the patients ability to reduce pain with ADLs    35 min Therapeutic Exercise:  [x] See flow sheet :   Rationale: increase ROM, increase strength, improve coordination, improve balance and increase proprioception to improve the patients ability to reduce shoulder pain elevation. With   [x] TE   [] TA   [] Neuro   [] SC   [] other: Patient Education: [x] Review HEP    [] Progressed/Changed HEP based on:   [] positioning   [] body mechanics   [] transfers   [] Use of heat/ice    [] other:          Other Objective/Functional Measures: Added passive pec stretch supine with towel roll parallel to mid spine, and scapula depression in supine position. ASSESSMENT/Changes in Function:   Pt with some trigger points along the anterior R shoulder and pain located posteriorly in the shoulder. Noted tightness in shanika pecs likely due to postural changes. Pt reporting improvement in shoulder mobility at end of session. Will continue to work on postural muscles to reduce shoulder pain. Patient will continue to benefit from skilled PT services to modify and progress therapeutic interventions, address functional mobility deficits, address ROM deficits, address strength deficits, analyze and address soft tissue restrictions, analyze and cue movement patterns, analyze and modify body mechanics/ergonomics and assess and modify postural abnormalities to attain remaining goals. GOALS/Progress towards goals:  Short Term Goals: To be accomplished in 6 treatments. 1.  Indep with a HEP to assist in rehab progression. [x]? Met []? Not met []? Partially met    2. >150 deg bilat shoulder flexion ROM to improve overhead reach. []? Met []? Not met [x]? Partially met    3. >120 deg R shoulder abduction ROM to improve reaching out away from the body. []? Met []? Not met [x]? Partially met    4. Improvement in IR ROM behind the back to manage clothing behind the back. []? Met []? Not met []? Partially met    5. Max R shoulder pain 5/10 to improve overall activity tolerance. []? Met []? Not met [x]? Partially met      Long Term Goals: To be accomplished in 12 treatments. 1.  Pt will be able to reach overhead to retrieve objects without bilat shoulder pain. []? Met []? Not met []? Partially met    2. Pt will be able to sleep on her sides without shoulder pain to improve her quality of sleep. []? Met []? Not met []? Partially met    3. Pt will show no signs of impingement to improve ability to reach in different planes of motion without shoulder pain. []? Met []? Not met []?  Partially met      PLAN  [x]  Upgrade activities as tolerated     [x]  Continue plan of care  []  Update interventions per flow sheet       []  Discharge due to:_  []  Other:_      Vern Armendariz, PT, DPT 1/25/2022

## 2022-01-27 ENCOUNTER — HOSPITAL ENCOUNTER (OUTPATIENT)
Dept: PHYSICAL THERAPY | Age: 57
Discharge: HOME | End: 2022-01-27
Payer: COMMERCIAL

## 2022-01-27 PROCEDURE — 97110 THERAPEUTIC EXERCISES: CPT

## 2022-01-27 PROCEDURE — 97014 ELECTRIC STIMULATION THERAPY: CPT

## 2022-01-27 NOTE — PROGRESS NOTES
PT DAILY TREATMENT NOTE - [facility]     Patient Name: Lauren Alamo  Date:2022  : 1965  [x]  Patient  Verified  Payor: Rosalba Person / Plan: [employer] [facility] [address] / Product Type: PPO /    Treatment Area: Pain in right shoulder [M25.511]  Impingement syndrome of right shoulder [M75.41]  Other shoulder lesions, right shoulder [M75.81]  Impingement syndrome of left shoulder [M75.42]  Other shoulder lesions, left shoulder [M75.82]  Pain in left shoulder [M25.512]   Next MD APPT:  In time: 3:38pm   Out time: 4:37pm  Total Treatment Time (min): 59  Total Timed Codes (min): 44  1:1 Treatment Time ( only): 44  Visit #:       SUBJECTIVE  Pain Level (0-10 scale) pre treatment:  3/10  Pain Level (0-10 scale) post treatment: 0/10  Any medication changes, allergies to medications, adverse drug reactions, diagnosis change, or new procedure performed?:   [x] No    [] Yes (see summary sheet for update)  Subjective functional status/changes:   [] No changes reported  Pt reporting onset of muscle soreness in the R shoulder last night. Pt states she rubbed icy/hot on the shoulder and it is feeling better today.         OBJECTIVE  Modality rationale: increase tissue extensibility to improve the patients ability to move the arms around without shoulder pain    Min Type Additional Details      15 [x] Estim: []Att   [x]Unatt    []TENS instruct                  [x]IFC  []Premod   []NMES                     []Other:  []w/US   []w/ice   [x]w/heat  Position:supine   Location: shanika shoulder        []  Traction: [] Cervical       []Lumbar                       [] Prone          []Supine                       []Intermittent   []Continuous Lbs:  [] before manual  [] after manual  []w/heat    []  Ultrasound: []Continuous   [] Pulsed                       at: []1MHz   []3MHz Location:  W/cm2:    [] Paraffin         Location:   []w/heat    []  Ice     [x]  Heat  []  Ice massage Position: supine   Location: shanika shoulders []  Laser  []  Other: Position:  Location:      []  Vasopneumatic Device Pressure:       [] lo [] med [] hi   Temperature:      [x] Skin assessment post-treatment:  [x]intact []redness- no adverse reaction    []redness - adverse reaction:       5 min Manual Therapy: [facility] mobs, passive pec stretching with pec release     Rationale: decrease pain, increase ROM, increase tissue extensibility and decrease trigger points to improve the patients ability to reduce pain with ADLs    39 min Therapeutic Exercise:  [x] See flow sheet :   Rationale: increase ROM, increase strength, improve coordination, improve balance and increase proprioception to improve the patients ability to reduce shoulder pain elevation. With   [x] TE   [] TA   [] Neuro   [] SC   [] other: Patient Education: [x] Review HEP    [] Progressed/Changed HEP based on:   [] positioning   [] body mechanics   [] transfers   [] Use of heat/ice    [] other:          Other Objective/Functional Measures: continued previous exercises and MT. Added Es this session     ASSESSMENT/Changes in Function:   Pt responded well to tx with no pain at end of session. Continues to have significant pec tightness and lower cervical tightness contributing to shoulder pain (R>L). Some muscle soreness on R from MT last session so did not appy as much pressure during MT. Will continue current POC. Patient will continue to benefit from skilled PT services to modify and progress therapeutic interventions, address functional mobility deficits, address ROM deficits, address strength deficits, analyze and address soft tissue restrictions, analyze and cue movement patterns, analyze and modify body mechanics/ergonomics and assess and modify postural abnormalities to attain remaining goals. GOALS/Progress towards goals:  Short Term Goals: To be accomplished in 6 treatments. 1.  Indep with a HEP to assist in rehab progression. [x]? Met []? Not met []? Partially met    2. >150 deg bilat shoulder flexion ROM to improve overhead reach. []? Met []? Not met [x]? Partially met    3. >120 deg R shoulder abduction ROM to improve reaching out away from the body. []? Met []? Not met [x]? Partially met    4. Improvement in IR ROM behind the back to manage clothing behind the back. []? Met []? Not met []? Partially met    5. Max R shoulder pain 5/10 to improve overall activity tolerance. []? Met []? Not met [x]? Partially met      Long Term Goals: To be accomplished in 12 treatments. 1.  Pt will be able to reach overhead to retrieve objects without bilat shoulder pain. []? Met []? Not met []? Partially met    2. Pt will be able to sleep on her sides without shoulder pain to improve her quality of sleep. []? Met []? Not met []? Partially met    3. Pt will show no signs of impingement to improve ability to reach in different planes of motion without shoulder pain. []? Met []? Not met []?  Partially met      PLAN  [x]  Upgrade activities as tolerated     [x]  Continue plan of care  [x]  Update interventions per flow sheet       []  Discharge due to:_  []  Other:_      Jesus Moya, PT, DPT 1/27/2022

## 2022-02-01 ENCOUNTER — HOSPITAL ENCOUNTER (OUTPATIENT)
Dept: PHYSICAL THERAPY | Age: 57
Discharge: HOME | End: 2022-02-01
Payer: COMMERCIAL

## 2022-02-01 PROCEDURE — 97014 ELECTRIC STIMULATION THERAPY: CPT

## 2022-02-01 PROCEDURE — 97110 THERAPEUTIC EXERCISES: CPT

## 2022-02-01 PROCEDURE — 97140 MANUAL THERAPY 1/> REGIONS: CPT

## 2022-02-01 NOTE — PROGRESS NOTES
PT DAILY TREATMENT NOTE - [facility]     Patient Name: Meme Mean  Date:2022  : 1965  [x]  Patient  Verified  Payor: ROBERT ANDERSON / Plan: [facility] [facility] [address] / Product Type: PPO /    Treatment Area: Pain in right shoulder [M25.511]  Impingement syndrome of right shoulder [M75.41]  Other shoulder lesions, right shoulder [M75.81]  Impingement syndrome of left shoulder [M75.42]  Other shoulder lesions, left shoulder [M75.82]  Pain in left shoulder [M25.512]   Next MD APPT:  In time: 3:38pm   Out time:  4:47pm  Total Treatment Time (min): 64  Total Timed Codes (min): 49  1:1 Treatment Time ( [address] only): 49  Visit #:       SUBJECTIVE  Pain Level (0-10 scale) pre treatment: 0/10   Pain Level (0-10 scale) post treatment: 0/10  Any medication changes, allergies to medications, adverse drug reactions, diagnosis change, or new procedure performed?:   [x] No    [] Yes (see summary sheet for update)  Subjective functional status/changes:   [] No changes reported  Pt states she did well after yesterday and two night ago she was having a hard time sleeping because of the R shoulder pain. Pt states it was hurting so bad she contemplated having surgery on it. She took two Aleve and that helped with the pain. No pain currently.        OBJECTIVE  Modality rationale: increase tissue extensibility to improve the patients ability to move the arms around without shoulder pain    Min Type Additional Details      15 [x] Estim: []Att   [x]Unatt    []TENS instruct                  [x]IFC  []Premod   []NMES                     []Other:  []w/US   []w/ice   [x]w/heat  Position:supine   Location: shanika shoulder        []  Traction: [] Cervical       []Lumbar                       [] Prone          []Supine                       []Intermittent   []Continuous Lbs:  [] before manual  [] after manual  []w/heat    []  Ultrasound: []Continuous   [] Pulsed                       at: []1MHz   []3MHz Location:  W/cm2:    [] Paraffin         Location:   []w/heat    []  Ice     [x]  Heat  []  Ice massage Position: supine   Location: shanika shoulders     []  Laser  []  Other: Position:  Location:      []  Vasopneumatic Device Pressure:       [] lo [] med [] hi   Temperature:      [x] Skin assessment post-treatment:  [x]intact []redness- no adverse reaction    []redness - adverse reaction:       10 min Manual Therapy: GH and scapula mobs, supraspinatus release    Rationale: decrease pain, increase ROM, increase tissue extensibility and decrease trigger points to improve the patients ability to reduce pain with ADLs    39 min Therapeutic Exercise:  [x] See flow sheet :   Rationale: increase ROM, increase strength, improve coordination, improve balance and increase proprioception to improve the patients ability to reduce shoulder pain elevation. With   [x] TE   [] TA   [] Neuro   [] SC   [] other: Patient Education: [x] Review HEP    [] Progressed/Changed HEP based on:   [] positioning   [] body mechanics   [] transfers   [] Use of heat/ice    [] other:          Other Objective/Functional Measures: Added sidelying shoulder abd, LS stretch and sleeper stretch      ASSESSMENT/Changes in Function:   Noted improvement in pec mobility but [address] mobility still limited with overhead movements. Noted trigger point in the R supraspinatus and posterior capsule tightness. Will continue to address soft tissue/joint hypomobility, shoulder ROM and strength/stabilization as tolerated. Patient will continue to benefit from skilled PT services to modify and progress therapeutic interventions, address functional mobility deficits, address ROM deficits, address strength deficits, analyze and address soft tissue restrictions, analyze and cue movement patterns, analyze and modify body mechanics/ergonomics and assess and modify postural abnormalities to attain remaining goals. GOALS/Progress towards goals:  Short Term Goals:  To be accomplished in 6 treatments. 1.  Indep with a HEP to assist in rehab progression. [x]? Met []? Not met []? Partially met    2. >150 deg bilat shoulder flexion ROM to improve overhead reach. []? Met []? Not met [x]? Partially met    3. >120 deg R shoulder abduction ROM to improve reaching out away from the body. []? Met []? Not met [x]? Partially met    4. Improvement in IR ROM behind the back to manage clothing behind the back. []? Met []? Not met []? Partially met    5. Max R shoulder pain 5/10 to improve overall activity tolerance. []? Met []? Not met [x]? Partially met      Long Term Goals: To be accomplished in 12 treatments. 1.  Pt will be able to reach overhead to retrieve objects without bilat shoulder pain. []? Met []? Not met []? Partially met    2. Pt will be able to sleep on her sides without shoulder pain to improve her quality of sleep. []? Met []? Not met []? Partially met    3. Pt will show no signs of impingement to improve ability to reach in different planes of motion without shoulder pain. []? Met []? Not met []?  Partially met      PLAN  [x]  Upgrade activities as tolerated     [x]  Continue plan of care  [x]  Update interventions per flow sheet       []  Discharge due to:_  [x]  Other:_  Reassessment next session     Adair Key, PT, DPT 2/1/2022

## 2022-02-03 ENCOUNTER — HOSPITAL ENCOUNTER (OUTPATIENT)
Dept: PHYSICAL THERAPY | Age: 57
Discharge: HOME | End: 2022-02-03
Payer: COMMERCIAL

## 2022-02-03 PROCEDURE — 97110 THERAPEUTIC EXERCISES: CPT

## 2022-02-03 PROCEDURE — 97140 MANUAL THERAPY 1/> REGIONS: CPT

## 2022-02-03 NOTE — PROGRESS NOTES
PT DAILY TREATMENT NOTE - [facility]     Patient Name: Sariah Larry  VUUB:  : 1965  [x]  Patient  Verified  Payor: ROBERT ANDERSON / Plan: [facility] [facility] [address] / Product Type: PPO /    Treatment Area: Pain in right shoulder [M25.511]  Impingement syndrome of right shoulder [M75.41]  Other shoulder lesions, right shoulder [M75.81]  Impingement syndrome of left shoulder [M75.42]  Other shoulder lesions, left shoulder [M75.82]  Pain in left shoulder [M25.512]   Next MD APPT:  Not scheduled   In time: 3:36pm  Out time: 4:15pm  Total Treatment Time (min):39  Total Timed Codes (min): 39  1:1 Treatment Time ( only): 44  Visit #:       SUBJECTIVE  Pain Level (0-10 scale) pre treatment: 0/10   Pain Level (0-10 scale) post treatment: 0/10  Any medication changes, allergies to medications, adverse drug reactions, diagnosis change, or new procedure performed?:   [x] No    [] Yes (see summary sheet for update)  Subjective functional status/changes:   [] No changes reported  Pt reports 60% improvement overall. She has not had any L shoulder pain lately but still getting the R shoulder pain with certain movements. Max R shoulder pain 6/10. Pt reports improvement in reaching overhead, still some pain with the R shoulder. Pt states she is sleeping primarily on the L side but can lie on the R some. She has had three episodes of sleepless night over the past 2 weeks due to shoulder pain which is progress.         OBJECTIVE  Modality rationale: increase tissue extensibility to improve the patients ability to move the arms around without shoulder pain    Min Type Additional Details       [x] Estim: []Att   [x]Unatt    []TENS instruct                  [x]IFC  []Premod   []NMES                     []Other:  []w/US   []w/ice   [x]w/heat  Position:supine   Location: shanika shoulder        []  Traction: [] Cervical       []Lumbar                       [] Prone          []Supine []Intermittent   []Continuous Lbs:  [] before manual  [] after manual  []w/heat    []  Ultrasound: []Continuous   [] Pulsed                       at: []1MHz   []3MHz Location:  W/cm2:    [] Paraffin         Location:   []w/heat    []  Ice     [x]  Heat  []  Ice massage Position: supine   Location: shanika shoulders     []  Laser  []  Other: Position:  Location:      []  Vasopneumatic Device Pressure:       [] lo [] med [] hi   Temperature:      [] Skin assessment post-treatment:  [x]intact []redness- no adverse reaction    []redness - adverse reaction:       10 min Manual Therapy: [address] [facility] mobs and STM R UT in prone    Rationale: decrease pain, increase ROM, increase tissue extensibility and decrease trigger points to improve the patients ability to reduce pain with ADLs    29 min Therapeutic Exercise:  [x] See flow sheet : Reassessment    Rationale: increase ROM, increase strength, improve coordination, improve balance and increase proprioception to improve the patients ability to reduce shoulder pain elevation. With   [x] TE   [] TA   [] Neuro   [] SC   [] other: Patient Education: [x] Review HEP    [] Progressed/Changed HEP based on:   [] positioning   [] body mechanics   [] transfers   [] Use of heat/ice    [] other:          Other Objective/Functional Measures:   Physical Findings   Ortho:   Posture: R scapula anterior tipping     Palpation: TTP R anterior deltoid   Gross findings: still requiring some cueing to correct posture      Specific joints: *normal values in ()     SHOULDER                                         AROM                        PROM                       MMT    R L R L R L   Flexion (180) 146 tightness 164     4+ 5   Extension (60) 44 53           Abduction (180) 140 P! 158     4+ P! 4+   Adduction (0)               IR (70) L2 P!   T8   54   5 5   ER (90) T3   T3   66   5 4+   Horizontal Abduction (130)               Horizontal Adduction (45)               Additional comments:    Special Tests: +R impingement sign, +relocation test     [facility] Score:   24.91%      ASSESSMENT/Changes in Function:   Pt has completed one month of PT services with improvement in shanika shoulder pain, ROM and strength. She reports increase activity tolerance. She continues to have some impingement in the R shoulder due to poor [address] positioning. Pt will benefit from continued PT services to assist in improving scapula stabilization to reduce impingement pain in the R shoulder. Pt has met or partially met 4 of 5 STGs and all LTGs at this time. Patient will continue to benefit from skilled PT services to modify and progress therapeutic interventions, address functional mobility deficits, address ROM deficits, address strength deficits, analyze and address soft tissue restrictions, analyze and cue movement patterns, analyze and modify body mechanics/ergonomics and assess and modify postural abnormalities to attain remaining goals. GOALS/Progress towards goals:  Short Term Goals: To be accomplished in 6 treatments. 1.  Indep with a HEP to assist in rehab progression. [x]? Met []? Not met []? Partially met    2. >150 deg bilat shoulder flexion ROM to improve overhead reach. []? Met []? Not met [x]? Partially met  2/3 improvement, very close to 150 on R   3. >120 deg R shoulder abduction ROM to improve reaching out away from the body. [x]? Met []? Not met []? Partially met   2/3  4. Improvement in IR ROM behind the back to manage clothing behind the back. []? Met [x]? Not met []? Partially met   2/3 still limited on R   5. Max R shoulder pain 5/10 to improve overall activity tolerance. []? Met []? Not met [x]? Partially met  2/3 improved, max pain on R 6/10    Long Term Goals: To be accomplished in 12 treatments. 1.  Pt will be able to reach overhead to retrieve objects without bilat shoulder pain. []? Met []? Not met [x]? Partially met  2/3 less pain  2.   Pt will be able to sleep on her sides without shoulder pain to improve her quality of sleep. []? Met []? Not met [x]? Partially met 2/3 less pain   3. Pt will show no signs of impingement to improve ability to reach in different planes of motion without shoulder pain. []? Met []? Not met [x]?  Partially met  2/3 no sign of impingement on L but still present on R     PLAN  [x]  Upgrade activities as tolerated     [x]  Continue plan of care  [x]  Update interventions per flow sheet       []  Discharge due to:_  []  Other:_    Yoanan Crabtree, PT, DPT 2/3/2022

## 2022-02-03 NOTE — PROGRESS NOTES
[address] [address] [facility] [address] [facility], [address] [place] [address]  Ph: [phone]    Fax: [phone]  Therapy Progress Report  Name: Liza Ochoa  : 1965   MD: Vy Lawrence, *     Medical Diagnosis: Pain in right shoulder [M25.511]  Impingement syndrome of right shoulder [M75.41]  Other shoulder lesions, right shoulder [M75.81]  Impingement syndrome of left shoulder [M75.42]  Other shoulder lesions, left shoulder [M75.82]  Pain in left shoulder [M25.512]  Start of Care: 22    Visits from Start of Care: 8   Missed Visits: 0  Certification Period: 22 - 22    Frequency/Duration: 2 times a week for 12 treatments   Summary of Care/Goals:  SUBJECTIVE  Pain Level (0-10 scale) pre treatment: 0/10   Pain Level (0-10 scale) post treatment: 0/10  Any medication changes, allergies to medications, adverse drug reactions, diagnosis change, or new procedure performed?:   [x]? No    []? Yes (see summary sheet for update)  Subjective functional status/changes:   []? No changes reported  Pt reports 60% improvement overall. She has not had any L shoulder pain lately but still getting the R shoulder pain with certain movements. Max R shoulder pain 6/10. Pt reports improvement in reaching overhead, still some pain with the R shoulder. Pt states she is sleeping primarily on the L side but can lie on the R some.   She has had three episodes of sleepless night over the past 2 weeks due to shoulder pain which is progress.         OBJECTIVE              Other Objective/Functional Measures:   Physical Findings   Ortho:   Posture: R scapula anterior tipping     Palpation: TTP R anterior deltoid   Gross findings: still requiring some cueing to correct posture      Specific joints: *normal values in ()     SHOULDER                                         AROM                        PROM                       MMT    R L R L R L   Flexion (180) 146 tightness 164     4+ 5 Extension (60) 44 53           Abduction (180) 140 P! 158     4+ P! 4+   Adduction (0)               IR (70) L2 P! T8   54   5 5   ER (90) T3   T3   66   5 4+   Horizontal Abduction (130)               Horizontal Adduction (45)               Additional comments:                     Special Tests: +R impingement sign, +relocation test      [facility] Score:   24.91%        ASSESSMENT/Changes in Function:   Pt has completed one month of PT services with improvement in shanika shoulder pain, ROM and strength. She reports increase activity tolerance. She continues to have some impingement in the R shoulder due to poor [address] positioning. Pt will benefit from continued PT services to assist in improving scapula stabilization to reduce impingement pain in the R shoulder. Pt has met or partially met 4 of 5 STGs and all LTGs at this time. Patient will continue to benefit from skilled PT services to modify and progress therapeutic interventions, address functional mobility deficits, address ROM deficits, address strength deficits, analyze and address soft tissue restrictions, analyze and cue movement patterns, analyze and modify body mechanics/ergonomics and assess and modify postural abnormalities to attain remaining goals.         GOALS/Progress towards goals:  Short Term Goals: To be accomplished in 6 treatments. 1.  Indep with a HEP to assist in rehab progression. [x]? ? Met []? ? Not met []? ? Partially met    2. >150 deg bilat shoulder flexion ROM to improve overhead reach. []?? Met []? ? Not met [x]? ? Partially met  2/3 improvement, very close to 150 on R   3. >120 deg R shoulder abduction ROM to improve reaching out away from the body. [x]? ? Met []? ? Not met []? ? Partially met   2/3  4. Improvement in IR ROM behind the back to manage clothing behind the back. []?? Met [x]? ? Not met []? ? Partially met   2/3 still limited on R   5. Max R shoulder pain 5/10 to improve overall activity tolerance. []?? Met []? ? Not met [x]?? Partially met  2/3 improved, max pain on R 6/10     Long Term Goals: To be accomplished in 12 treatments. 1.  Pt will be able to reach overhead to retrieve objects without bilat shoulder pain. []?? Met []? ? Not met [x]? ? Partially met  2/3 less pain  2.  Pt will be able to sleep on her sides without shoulder pain to improve her quality of sleep. []?? Met []? ? Not met [x]? ? Partially met 2/3 less pain   3. Pt will show no signs of impingement to improve ability to reach in different planes of motion without shoulder pain. []?? Met []? ? Not met [x]? ? Partially met  2/3 no sign of impingement on L but still present on R     Recommendations: continue current POC   [x]  Plan of care has been reviewed with PTA. Aye Harrington, PT, DPT 2/3/2022     ________________________________________________________________________     Please retain this original for your records.

## 2022-02-15 ENCOUNTER — HOSPITAL ENCOUNTER (OUTPATIENT)
Dept: PHYSICAL THERAPY | Age: 57
Discharge: HOME | End: 2022-02-15
Payer: COMMERCIAL

## 2022-02-15 PROCEDURE — 97140 MANUAL THERAPY 1/> REGIONS: CPT

## 2022-02-15 PROCEDURE — 97110 THERAPEUTIC EXERCISES: CPT

## 2022-02-15 NOTE — PROGRESS NOTES
PT DAILY TREATMENT NOTE - [facility]     Patient Name: Jhoan Alcaraz  Date:2/15/2022  : 1965  [x]  Patient  Verified  Payor: Keyla Martinez / Plan: [employer] [facility] [address] / Product Type: PPO /    Treatment Area: Pain in right shoulder [M25.511]  Impingement syndrome of right shoulder [M75.41]  Other shoulder lesions, right shoulder [M75.81]  Impingement syndrome of left shoulder [M75.42]  Other shoulder lesions, left shoulder [M75.82]  Pain in left shoulder [M25.512]   Next MD APPT:  Not scheduled   In time: 3:58pm  Out time: 4:45pm  Total Treatment Time (min):47  Total Timed Codes (min): 47  1:1 Treatment Time ( only): 52  Visit #:       SUBJECTIVE  Pain Level (0-10 scale) pre treatment: 0/10   Pain Level (0-10 scale) post treatment: 0/10  Any medication changes, allergies to medications, adverse drug reactions, diagnosis change, or new procedure performed?:   [x] No    [] Yes (see summary sheet for update)  Subjective functional status/changes:   [] No changes reported  No new complaints. Pt states the pain is positional.  Can feel it with some of the stretches and in different positions of the arm.         OBJECTIVE  Modality rationale: increase tissue extensibility to improve the patients ability to move the arms around without shoulder pain    Min Type Additional Details       [x] Estim: []Att   [x]Unatt    []TENS instruct                  [x]IFC  []Premod   []NMES                     []Other:  []w/US   []w/ice   [x]w/heat  Position:supine   Location: shanika shoulder        []  Traction: [] Cervical       []Lumbar                       [] Prone          []Supine                       []Intermittent   []Continuous Lbs:  [] before manual  [] after manual  []w/heat    []  Ultrasound: []Continuous   [] Pulsed                       at: []1MHz   []3MHz Location:  W/cm2:    [] Paraffin         Location:   []w/heat    []  Ice     [x]  Heat  []  Ice massage Position: supine   Location: shanika shoulders []  Laser  []  Other: Position:  Location:      []  Vasopneumatic Device Pressure:       [] lo [] med [] hi   Temperature:      [] Skin assessment post-treatment:  []intact []redness- no adverse reaction    []redness - adverse reaction:       15 min Manual Therapy: GH mobs and passive stretching    Rationale: decrease pain, increase ROM, increase tissue extensibility and decrease trigger points to improve the patients ability to reduce pain with ADLs    32 min Therapeutic Exercise:  [x] See flow sheet : Reassessment    Rationale: increase ROM, increase strength, improve coordination, improve balance and increase proprioception to improve the patients ability to reduce shoulder pain elevation. With   [] TE   [] TA   [] Neuro   [] SC   [] other: Patient Education: [x] Review HEP    [x] Progressed/Changed HEP based on:  Post capsule tightness   [] positioning   [] body mechanics   [] transfers   [] Use of heat/ice    [] other:          Other Objective/Functional Measures:   Continued to work on shoulder ROM/flexibility, scapula stabilization. Combined scapula depression and retraction in supine position, added prone horiz ER     ASSESSMENT/Changes in Function:   Pt responded well to tx. Noted tightness in the posterior capsule limiting end range flexion and IR behind the back. Noted significant improvement in IR behind back mobility following MT. Pt had a difficult time perform scapula depression and retraction together. Will need reinforcement. Patient will continue to benefit from skilled PT services to modify and progress therapeutic interventions, address functional mobility deficits, address ROM deficits, address strength deficits, analyze and address soft tissue restrictions, analyze and cue movement patterns, analyze and modify body mechanics/ergonomics and assess and modify postural abnormalities to attain remaining goals. GOALS/Progress towards goals:  Short Term Goals:  To be accomplished in 6 treatments. 1.  Indep with a HEP to assist in rehab progression. [x]? Met []? Not met []? Partially met    2. >150 deg bilat shoulder flexion ROM to improve overhead reach. []? Met []? Not met [x]? Partially met  2/3 improvement, very close to 150 on R   3. >120 deg R shoulder abduction ROM to improve reaching out away from the body. [x]? Met []? Not met []? Partially met   2/3  4. Improvement in IR ROM behind the back to manage clothing behind the back. []? Met [x]? Not met []? Partially met   2/3 still limited on R   5. Max R shoulder pain 5/10 to improve overall activity tolerance. []? Met []? Not met [x]? Partially met  2/3 improved, max pain on R 6/10    Long Term Goals: To be accomplished in 12 treatments. 1.  Pt will be able to reach overhead to retrieve objects without bilat shoulder pain. []? Met []? Not met [x]? Partially met  2/3 less pain  2. Pt will be able to sleep on her sides without shoulder pain to improve her quality of sleep. []? Met []? Not met [x]? Partially met 2/3 less pain   3. Pt will show no signs of impingement to improve ability to reach in different planes of motion without shoulder pain. []? Met []? Not met [x]?  Partially met  2/3 no sign of impingement on L but still present on R     PLAN  [x]  Upgrade activities as tolerated     [x]  Continue plan of care  [x]  Update interventions per flow sheet       []  Discharge due to:_  []  Other:_    Bambi Fernandez, PT, DPT 2/15/2022

## 2022-03-01 ENCOUNTER — HOSPITAL ENCOUNTER (OUTPATIENT)
Dept: PHYSICAL THERAPY | Age: 57
Discharge: HOME | End: 2022-03-01
Payer: COMMERCIAL

## 2022-03-01 PROCEDURE — 97140 MANUAL THERAPY 1/> REGIONS: CPT

## 2022-03-01 PROCEDURE — 97110 THERAPEUTIC EXERCISES: CPT

## 2022-03-01 NOTE — PROGRESS NOTES
PT DAILY TREATMENT NOTE - [facility]     Patient Name: Fern Mendoza  Date:3/1/2022  : 1965  [x]  Patient  Verified  Payor: Karina Morgan / Plan: [employer] [facility] [address] / Product Type: PPO /    Treatment Area: Pain in right shoulder [M25.511]  Impingement syndrome of right shoulder [M75.41]  Other shoulder lesions, right shoulder [M75.81]  Impingement syndrome of left shoulder [M75.42]  Other shoulder lesions, left shoulder [M75.82]  Pain in left shoulder [M25.512]   Next MD APPT:  Not scheduled   In time: 4:17pm  Out time: 5:07pm  Total Treatment Time (min):50  Total Timed Codes (min): 50  1:1 Treatment Time ( only): 50  Visit #: 10/12      SUBJECTIVE  Pain Level (0-10 scale) pre treatment: 4/10   Pain Level (0-10 scale) post treatment: 4/10  Any medication changes, allergies to medications, adverse drug reactions, diagnosis change, or new procedure performed?:   [x] No    [] Yes (see summary sheet for update)  Subjective functional status/changes:   [] No changes reported  Pt states her shoulder has been hurting ever since Saturday. Pt states she had to miss some time due to her son getting . Pt states she has been doing the exercises some but not everyday.         OBJECTIVE  Modality rationale: increase tissue extensibility to improve the patients ability to move the arms around without shoulder pain    Min Type Additional Details       [x] Estim: []Att   [x]Unatt    []TENS instruct                  [x]IFC  []Premod   []NMES                     []Other:  []w/US   []w/ice   [x]w/heat  Position:supine   Location: shanika shoulder        []  Traction: [] Cervical       []Lumbar                       [] Prone          []Supine                       []Intermittent   []Continuous Lbs:  [] before manual  [] after manual  []w/heat    []  Ultrasound: []Continuous   [] Pulsed                       at: []1MHz   []3MHz Location:  W/cm2:    [] Paraffin         Location:   []w/heat    []  Ice     [x] Heat  []  Ice massage Position: supine   Location: shanika shoulders     []  Laser  []  Other: Position:  Location:      []  Vasopneumatic Device Pressure:       [] lo [] med [] hi   Temperature:      [] Skin assessment post-treatment:  []intact []redness- no adverse reaction    []redness - adverse reaction:       15 min Manual Therapy: STM/trigger point release R UT/scalenes/pecs/anterior-medial deltoid. Passive R shoulder stretching    Rationale: decrease pain, increase ROM, increase tissue extensibility and decrease trigger points to improve the patients ability to reduce pain with ADLs    35 min Therapeutic Exercise:  [x] See flow sheet : Reassessment    Rationale: increase ROM, increase strength, improve coordination, improve balance and increase proprioception to improve the patients ability to reduce shoulder pain elevation. With   [x] TE   [] TA   [] Neuro   [] SC   [] other: Patient Education: [x] Review HEP    [x] Progressed/Changed HEP based on:  Post capsule tightness   [] positioning   [] body mechanics   [] transfers   [] Use of heat/ice    [] other:          Other Objective/Functional Measures:   Resumed previous exercises. Reviewed sleeper's stretch and prone scapula retraction for HEP. ASSESSMENT/Changes in Function:   Pt returned after two weeks reporting increase in pain and tightness. Noted more capsule and soft tissue tightness. Also noted more complaint of pain in the anterior shoulder this session. Reviewed exercises for home for posterior capsule stretching and scapula stabilization. Will continue current POC.     Patient will continue to benefit from skilled PT services to modify and progress therapeutic interventions, address functional mobility deficits, address ROM deficits, address strength deficits, analyze and address soft tissue restrictions, analyze and cue movement patterns, analyze and modify body mechanics/ergonomics and assess and modify postural abnormalities to attain remaining goals. GOALS/Progress towards goals:  Short Term Goals: To be accomplished in 6 treatments. 1.  Indep with a HEP to assist in rehab progression. [x]? Met []? Not met []? Partially met    2. >150 deg bilat shoulder flexion ROM to improve overhead reach. []? Met []? Not met [x]? Partially met  2/3 improvement, very close to 150 on R   3. >120 deg R shoulder abduction ROM to improve reaching out away from the body. [x]? Met []? Not met []? Partially met   2/3  4. Improvement in IR ROM behind the back to manage clothing behind the back. []? Met [x]? Not met []? Partially met   2/3 still limited on R   5. Max R shoulder pain 5/10 to improve overall activity tolerance. []? Met []? Not met [x]? Partially met  2/3 improved, max pain on R 6/10    Long Term Goals: To be accomplished in 12 treatments. 1.  Pt will be able to reach overhead to retrieve objects without bilat shoulder pain. []? Met []? Not met [x]? Partially met  2/3 less pain  2. Pt will be able to sleep on her sides without shoulder pain to improve her quality of sleep. []? Met []? Not met [x]? Partially met 2/3 less pain   3. Pt will show no signs of impingement to improve ability to reach in different planes of motion without shoulder pain. []? Met []? Not met [x]?  Partially met  2/3 no sign of impingement on L but still present on R     PLAN  [x]  Upgrade activities as tolerated     [x]  Continue plan of care  [x]  Update interventions per flow sheet       []  Discharge due to:_  []  Other:_    Michelle Hannah, PT, DPT 3/1/2022

## 2022-03-03 ENCOUNTER — HOSPITAL ENCOUNTER (OUTPATIENT)
Dept: PHYSICAL THERAPY | Age: 57
Discharge: HOME | End: 2022-03-03
Payer: COMMERCIAL

## 2022-03-03 PROCEDURE — 97110 THERAPEUTIC EXERCISES: CPT

## 2022-03-03 PROCEDURE — 97112 NEUROMUSCULAR REEDUCATION: CPT

## 2022-03-03 NOTE — PROGRESS NOTES
PT DAILY TREATMENT NOTE - [facility]     Patient Name: Jacob Lane  Date:3/3/2022  : 1965  [x]  Patient  Verified  Payor: ROBERT ANDERSON / Plan: MARLENY KNIGHT [facility] [address] / Product Type: PPO /    Treatment Area: Pain in right shoulder [M25.511]  Impingement syndrome of right shoulder [M75.41]  Other shoulder lesions, right shoulder [M75.81]  Impingement syndrome of left shoulder [M75.42]  Other shoulder lesions, left shoulder [M75.82]  Pain in left shoulder [M25.512]   Next MD APPT:  Not scheduled   In time: 4:20pm  Out time: 5:05pm  Total Treatment Time (min):45  Total Timed Codes (min): 45  1:1 Treatment Time ( only): 39  Visit #:       SUBJECTIVE  Pain Level (0-10 scale) pre treatment: 4/10   Pain Level (0-10 scale) post treatment: 4/10  Any medication changes, allergies to medications, adverse drug reactions, diagnosis change, or new procedure performed?:   [x] No    [] Yes (see summary sheet for update)  Subjective functional status/changes:   [] No changes reported  Pt states her shoulders have been sore ever since the last session. Pt states the pain has been radiating down the R arm some and the arm feels a little weak.       OBJECTIVE  Modality rationale: increase tissue extensibility to improve the patients ability to move the arms around without shoulder pain    Min Type Additional Details       [x] Estim: []Att   [x]Unatt    []TENS instruct                  [x]IFC  []Premod   []NMES                     []Other:  []w/US   []w/ice   [x]w/heat  Position:supine   Location: shanika shoulder        []  Traction: [] Cervical       []Lumbar                       [] Prone          []Supine                       []Intermittent   []Continuous Lbs:  [] before manual  [] after manual  []w/heat    []  Ultrasound: []Continuous   [] Pulsed                       at: []1MHz   []3MHz Location:  W/cm2:    [] Paraffin         Location:   []w/heat    []  Ice     [x]  Heat  []  Ice massage Position: supine Location: shanika shoulders     []  Laser  []  Other: Position:  Location:      []  Vasopneumatic Device Pressure:       [] lo [] med [] hi   Temperature:      [] Skin assessment post-treatment:  []intact []redness- no adverse reaction    []redness - adverse reaction:        min Manual Therapy: STM/trigger point release R UT/scalenes/pecs/anterior-medial deltoid. Passive R shoulder stretching    Rationale: decrease pain, increase ROM, increase tissue extensibility and decrease trigger points to improve the patients ability to reduce pain with ADLs    35 min Therapeutic Exercise:  [x] See flow sheet : Reassessment    Rationale: increase ROM, increase strength, improve coordination, improve balance and increase proprioception to improve the patients ability to reduce shoulder pain elevation. 10 min Neuromuscular Re-education:  []  See flow sheet : scapula depression/retraction and K-tape for RTC support    Rationale: increase ROM and increase strength  to improve the patients ability to move the arms without shoulder pain     With   [x] TE   [] TA   [] Neuro   [] SC   [] other: Patient Education: [x] Review HEP    [x] Progressed/Changed HEP based on:  Post capsule tightness   [] positioning   [] body mechanics   [] transfers   [] Use of heat/ice    [] other:          Other Objective/Functional Measures:   Worked on stretching and R shoulder/scapula stabilization this session. Added K-tape to assist in stabilization of shoulder. ASSESSMENT/Changes in Function:   Noted more ant tipping of the scapula on the R with humeral head anterior to the glenoid fossa. This is likely contributing to the impingement in the R shoulder. Pt reporting more tightness in neck on L versus R during stretches. Pt required a lot of cueing with scapula depression//retraction exercise, difficulty performing the two movements together. Will continue to work on stabilization as tolerated.     Patient will continue to benefit from skilled PT services to modify and progress therapeutic interventions, address functional mobility deficits, address ROM deficits, address strength deficits, analyze and address soft tissue restrictions, analyze and cue movement patterns, analyze and modify body mechanics/ergonomics and assess and modify postural abnormalities to attain remaining goals. GOALS/Progress towards goals:  Short Term Goals: To be accomplished in 6 treatments. 1.  Indep with a HEP to assist in rehab progression. [x]? Met []? Not met []? Partially met    2. >150 deg bilat shoulder flexion ROM to improve overhead reach. []? Met []? Not met [x]? Partially met  2/3 improvement, very close to 150 on R   3. >120 deg R shoulder abduction ROM to improve reaching out away from the body. [x]? Met []? Not met []? Partially met   2/3  4. Improvement in IR ROM behind the back to manage clothing behind the back. []? Met [x]? Not met []? Partially met   2/3 still limited on R   5. Max R shoulder pain 5/10 to improve overall activity tolerance. []? Met []? Not met [x]? Partially met  2/3 improved, max pain on R 6/10    Long Term Goals: To be accomplished in 12 treatments. 1.  Pt will be able to reach overhead to retrieve objects without bilat shoulder pain. []? Met []? Not met [x]? Partially met  2/3 less pain  2. Pt will be able to sleep on her sides without shoulder pain to improve her quality of sleep. []? Met []? Not met [x]? Partially met 2/3 less pain   3. Pt will show no signs of impingement to improve ability to reach in different planes of motion without shoulder pain. []? Met []? Not met [x]?  Partially met  2/3 no sign of impingement on L but still present on R     PLAN  [x]  Upgrade activities as tolerated     [x]  Continue plan of care  [x]  Update interventions per flow sheet       []  Discharge due to:_  []  Other:_    Rosalia Davis, PT, DPT 3/3/2022

## 2022-03-10 ENCOUNTER — TRANSCRIBE ORDER (OUTPATIENT)
Dept: SCHEDULING | Age: 57
End: 2022-03-10

## 2022-03-10 DIAGNOSIS — M75.41 SUBACROMIAL IMPINGEMENT OF RIGHT SHOULDER: ICD-10-CM

## 2022-03-10 DIAGNOSIS — M75.81 TENDINITIS OF RIGHT ROTATOR CUFF: Primary | ICD-10-CM

## 2022-03-10 DIAGNOSIS — M75.42 SHOULDER IMPINGEMENT SYNDROME, LEFT: ICD-10-CM

## 2022-03-15 ENCOUNTER — APPOINTMENT (OUTPATIENT)
Dept: PHYSICAL THERAPY | Age: 57
End: 2022-03-15
Payer: COMMERCIAL

## 2022-03-17 ENCOUNTER — APPOINTMENT (OUTPATIENT)
Dept: PHYSICAL THERAPY | Age: 57
End: 2022-03-17
Payer: COMMERCIAL

## 2022-03-18 PROBLEM — H92.09 OTALGIA: Status: ACTIVE | Noted: 2020-06-12

## 2022-03-19 ENCOUNTER — HOSPITAL ENCOUNTER (OUTPATIENT)
Dept: MRI IMAGING | Age: 57
Discharge: HOME | End: 2022-03-19
Attending: ORTHOPAEDIC SURGERY
Payer: COMMERCIAL

## 2022-03-19 DIAGNOSIS — M75.42 SHOULDER IMPINGEMENT SYNDROME, LEFT: ICD-10-CM

## 2022-03-19 DIAGNOSIS — M75.41 SUBACROMIAL IMPINGEMENT OF RIGHT SHOULDER: ICD-10-CM

## 2022-03-19 DIAGNOSIS — M75.81 TENDINITIS OF RIGHT ROTATOR CUFF: ICD-10-CM

## 2022-03-19 PROBLEM — J34.9 SINUS PROBLEM: Status: ACTIVE | Noted: 2020-06-12

## 2022-03-19 PROBLEM — H66.90 INFECTION OF EAR: Status: ACTIVE | Noted: 2020-06-12

## 2022-03-19 PROCEDURE — 73221 MRI JOINT UPR EXTREM W/O DYE: CPT

## 2022-03-22 ENCOUNTER — APPOINTMENT (OUTPATIENT)
Dept: PHYSICAL THERAPY | Age: 57
End: 2022-03-22
Payer: COMMERCIAL

## 2022-03-24 ENCOUNTER — APPOINTMENT (OUTPATIENT)
Dept: PHYSICAL THERAPY | Age: 57
End: 2022-03-24
Payer: COMMERCIAL

## 2022-04-04 ENCOUNTER — HOSPITAL ENCOUNTER (OUTPATIENT)
Dept: PREADMISSION TESTING | Age: 57
Discharge: HOME | End: 2022-04-04
Payer: COMMERCIAL

## 2022-04-04 VITALS
BODY MASS INDEX: 32.46 KG/M2 | RESPIRATION RATE: 14 BRPM | DIASTOLIC BLOOD PRESSURE: 82 MMHG | OXYGEN SATURATION: 97 % | TEMPERATURE: 97.1 F | SYSTOLIC BLOOD PRESSURE: 169 MMHG | HEIGHT: 64 IN | HEART RATE: 69 BPM | WEIGHT: 190.1 LBS

## 2022-04-04 LAB
ANION GAP SERPL CALC-SCNC: 4 MMOL/L (ref 5–15)
BUN SERPL-MCNC: 8 MG/DL (ref 6–20)
BUN/CREAT SERPL: 11 (ref 12–20)
CALCIUM SERPL-MCNC: 9.5 MG/DL (ref 8.5–10.1)
CHLORIDE SERPL-SCNC: 105 MMOL/L (ref 97–108)
CO2 SERPL-SCNC: 30 MMOL/L (ref 21–32)
CREAT SERPL-MCNC: 0.76 MG/DL (ref 0.55–1.02)
GLUCOSE SERPL-MCNC: 115 MG/DL (ref 65–100)
POTASSIUM SERPL-SCNC: 4.5 MMOL/L (ref 3.5–5.1)
SODIUM SERPL-SCNC: 139 MMOL/L (ref 136–145)

## 2022-04-04 PROCEDURE — 80048 BASIC METABOLIC PNL TOTAL CA: CPT

## 2022-04-04 PROCEDURE — 93005 ELECTROCARDIOGRAM TRACING: CPT

## 2022-04-04 PROCEDURE — 36415 COLL VENOUS BLD VENIPUNCTURE: CPT

## 2022-04-04 NOTE — H&P (VIEW-ONLY)
History and Physical    Patient: Savanna Best MRN: [id number]  SSN: [id number]    YOB: 1965  Age: 61 y.o. Sex: female      CC: Right shoulder pain    Subjective:      Savanna Best is a 61 y.o. female referred for pre-operative evaluation by Dr. Elijah Marquez for surgery on 4/11/22. Geoffry Means notes she has not sustained any injury to her shoulder. She developed pain a few years ago but in the last 8 months it has gotten worse. She is RHD. She finds it hard to lift up her arm or lift out. She has to sleep with her arm outstretched. The patient was evaluated in the surgeon's office and it was determined that the most appropriate plan of care is to proceed with surgical intervention. Patient's PCP Emerald Munoz MD    Past Medical History:   Diagnosis Date    Fatty liver     GERD (gastroesophageal reflux disease)     Hypertension     Mitral valve prolapse     ECHO done yearly    Otalgia 6/12/2020    Sinus problem 9/4/2020     Past Surgical History:   Procedure Laterality Date    COLONOSCOPY N/A 3/3/2021    COLONOSCOPY performed by Christopher Villaseñor MD at [address] HX CHOLECYSTECTOMY      HX PARTIAL HYSTERECTOMY      IMPLANT BREAST SILICONE/EQ Bilateral 1342    [place] REMOVAL OF BREAST LESION      fibrominoma removed      Family History   Problem Relation Age of Onset    Breast Cancer Paternal Aunt     Uterine Cancer Paternal Aunt      Social History     Tobacco Use    Smoking status: Never Smoker    Smokeless tobacco: Never Used   Substance Use Topics    Alcohol use: Yes     Comment: social    Drug use: Never      Prior to Admission medications    Medication Sig Start Date End Date Taking? Authorizing Provider   metoprolol succinate (TOPROL-XL) 100 mg tablet Take 100 mg by mouth every morning. Yes Provider, Historical   olmesartan (BENICAR) 20 mg tablet Take 20 mg by mouth every morning.    Yes Provider, Historical   spironolactone (ALDACTONE) 25 mg tablet Take 25 mg by mouth every morning. Yes Provider, Historical        Allergies   Allergen Reactions    Erythromycin Base Palpitations     Heart racing    Zithromax Z-Joshua [Azithromycin] Other (comments)     Sharp chest pain    Hydrochlorothiazide Other (comments)     Dropped Potassium    Lisinopril Rash    Morphine Nausea and Vomiting    Penicillin G Rash     Had has synthetic since then       Review of Systems:  Constitutional: Negative for chills and fever  HENT: Negative for congestion and sore throat  Eyes: negative for blurred vision and double vision  Respiratory: Negative for cough, shortness of breath and wheezing  Mouth: Negative for loose, broken or chipped teeth. Cardiovascular: Negative for chest pain and palpitations  Gastrointestinal: Negative for abdominal pain, constipation, diarrhea and nausea  Genitourinary: Negative for dysuria and hematuria  Musculoskeletal: Right shoulder pain  Skin: Negative for rash, open wounds.    Neurological: Negative for dizziness, tremors and headaches  Psychiatric: Negative for anxiety    Objective:     Vitals:    04/04/22 1456 04/04/22 1530   BP: (!) 172/85 (!) 169/82   Pulse: 69    Resp: 14    Temp: 97.1 °F (36.2 °C)    SpO2: 97%    Weight: 86.2 kg (190 lb 1.6 oz)    Height: 5' 4\" (1.626 m)         Physical Exam:  General Appearance: Alert, Well Appearing and in no distress  Mental Status: Normal mood, behavior, speech and dress  Neck: Normal appearance externally  Ears: External canal no drainage  Nose: Normal external appearance and no drainage   Chest: Clear to auscultation, no wheezes, rales or rhonchi  Heart: Normal rate, regular rhythm, no murmurs, rubs, clicks or gallops  Skin: Normal color, no lesions externally  Abdomen: Not examined  Neuro: Not examined  Musculoskeletal: Limited ROM to right shoulder    Recent Results (from the past 168 hour(s))   METABOLIC PANEL, BASIC    Collection Time: 04/04/22  3:30 PM   Result Value Ref Range    Sodium 139 136 - 145 mmol/L Potassium 4.5 3.5 - 5.1 mmol/L    Chloride 105 97 - 108 mmol/L    CO2 30 21 - 32 mmol/L    Anion gap 4 (L) 5 - 15 mmol/L    Glucose 115 (H) 65 - 100 mg/dL    BUN 8 6 - 20 MG/DL    Creatinine 0.76 0.55 - 1.02 MG/DL    BUN/Creatinine ratio 11 (L) 12 - 20      GFR est AA >60 >60 ml/min/1.73m2    GFR est non-AA >60 >60 ml/min/1.73m2    Calcium 9.5 8.5 - 10.1 MG/DL   EKG, 12 LEAD, INITIAL    Collection Time: 04/04/22  3:53 PM   Result Value Ref Range    Ventricular Rate 75 BPM    Atrial Rate 75 BPM    P-R Interval 200 ms    QRS Duration 68 ms    Q-T Interval 372 ms    QTC Calculation (Bezet) 415 ms    Calculated P Axis 59 degrees    Calculated R Axis 3 degrees    Calculated T Axis 58 degrees    Diagnosis       Normal sinus rhythm  Anterior infarct , age undetermined  Abnormal ECG  No previous ECGs available           Assessment: Torn right RC  Pre-Operative Evaluation    Plan:     Right RC Repair  Labs and EKG reviewed.        Signed By: Kiera Mabry NP     April 5, 2022

## 2022-04-04 NOTE — H&P
History and Physical    Patient: Celso Funes MRN: [id number]  SSN: [id number]    YOB: 1965  Age: 61 y.o. Sex: female      CC: Right shoulder pain    Subjective:      Celso Funes is a 61 y.o. female referred for pre-operative evaluation by Dr. Madison Del Castillo for surgery on 4/11/22. Urvashimichaelle Meier notes she has not sustained any injury to her shoulder. She developed pain a few years ago but in the last 8 months it has gotten worse. She is RHD. She finds it hard to lift up her arm or lift out. She has to sleep with her arm outstretched. The patient was evaluated in the surgeon's office and it was determined that the most appropriate plan of care is to proceed with surgical intervention. Patient's PCP Shama Canela MD    Past Medical History:   Diagnosis Date    Fatty liver     GERD (gastroesophageal reflux disease)     Hypertension     Mitral valve prolapse     ECHO done yearly    Otalgia 6/12/2020    Sinus problem 9/4/2020     Past Surgical History:   Procedure Laterality Date    COLONOSCOPY N/A 3/3/2021    COLONOSCOPY performed by Jon Proctor MD at [address] [place] [address] HX CHOLECYSTECTOMY      HX PARTIAL HYSTERECTOMY      IMPLANT BREAST SILICONE/EQ Bilateral 6299    MI REMOVAL OF BREAST LESION      fibrominoma removed      Family History   Problem Relation Age of Onset    Breast Cancer Paternal Aunt     Uterine Cancer Paternal Aunt      Social History     Tobacco Use    Smoking status: Never Smoker    Smokeless tobacco: Never Used   Substance Use Topics    Alcohol use: Yes     Comment: social    Drug use: Never      Prior to Admission medications    Medication Sig Start Date End Date Taking? Authorizing Provider   metoprolol succinate (TOPROL-XL) 100 mg tablet Take 100 mg by mouth every morning. Yes Provider, Historical   olmesartan (BENICAR) 20 mg tablet Take 20 mg by mouth every morning.    Yes Provider, Historical   spironolactone (ALDACTONE) 25 mg tablet Take 25 mg by mouth every morning. Yes Provider, Historical        Allergies   Allergen Reactions    Erythromycin Base Palpitations     Heart racing    Zithromax Z-Joshua [Azithromycin] Other (comments)     Sharp chest pain    Hydrochlorothiazide Other (comments)     Dropped Potassium    Lisinopril Rash    Morphine Nausea and Vomiting    Penicillin G Rash     Had has synthetic since then       Review of Systems:  Constitutional: Negative for chills and fever  HENT: Negative for congestion and sore throat  Eyes: negative for blurred vision and double vision  Respiratory: Negative for cough, shortness of breath and wheezing  Mouth: Negative for loose, broken or chipped teeth. Cardiovascular: Negative for chest pain and palpitations  Gastrointestinal: Negative for abdominal pain, constipation, diarrhea and nausea  Genitourinary: Negative for dysuria and hematuria  Musculoskeletal: Right shoulder pain  Skin: Negative for rash, open wounds.    Neurological: Negative for dizziness, tremors and headaches  Psychiatric: Negative for anxiety    Objective:     Vitals:    04/04/22 1456 04/04/22 1530   BP: (!) 172/85 (!) 169/82   Pulse: 69    Resp: 14    Temp: 97.1 °F (36.2 °C)    SpO2: 97%    Weight: 86.2 kg (190 lb 1.6 oz)    Height: 5' 4\" (1.626 m)         Physical Exam:  General Appearance: Alert, Well Appearing and in no distress  Mental Status: Normal mood, behavior, speech and dress  Neck: Normal appearance externally  Ears: External canal no drainage  Nose: Normal external appearance and no drainage   Chest: Clear to auscultation, no wheezes, rales or rhonchi  Heart: Normal rate, regular rhythm, no murmurs, rubs, clicks or gallops  Skin: Normal color, no lesions externally  Abdomen: Not examined  Neuro: Not examined  Musculoskeletal: Limited ROM to right shoulder    Recent Results (from the past 168 hour(s))   METABOLIC PANEL, BASIC    Collection Time: 04/04/22  3:30 PM   Result Value Ref Range    Sodium 139 136 - 145 mmol/L Potassium 4.5 3.5 - 5.1 mmol/L    Chloride 105 97 - 108 mmol/L    CO2 30 21 - 32 mmol/L    Anion gap 4 (L) 5 - 15 mmol/L    Glucose 115 (H) 65 - 100 mg/dL    BUN 8 6 - 20 MG/DL    Creatinine 0.76 0.55 - 1.02 MG/DL    BUN/Creatinine ratio 11 (L) 12 - 20      GFR est AA >60 >60 ml/min/1.73m2    GFR est non-AA >60 >60 ml/min/1.73m2    Calcium 9.5 8.5 - 10.1 MG/DL   EKG, 12 LEAD, INITIAL    Collection Time: 04/04/22  3:53 PM   Result Value Ref Range    Ventricular Rate 75 BPM    Atrial Rate 75 BPM    P-R Interval 200 ms    QRS Duration 68 ms    Q-T Interval 372 ms    QTC Calculation (Bezet) 415 ms    Calculated P Axis 59 degrees    Calculated R Axis 3 degrees    Calculated T Axis 58 degrees    Diagnosis       Normal sinus rhythm  Anterior infarct , age undetermined  Abnormal ECG  No previous ECGs available           Assessment: Torn right RC  Pre-Operative Evaluation    Plan:     Right RC Repair  Labs and EKG reviewed.        Signed By: Heidi Bailey NP     April 5, 2022

## 2022-04-04 NOTE — PERIOP NOTES
[address] , [address] [facility]   MAIN OR                                  [phone]   MAIN PRE OP                          [phone]                                                                                AMBULATORY PRE OP          [phone]  PRE-ADMISSION TESTING    [phone]   Surgery Date:  Monday 4/11/2022       Is surgery arrival time given by surgeon? NO  If NO, [facility] staff will call you between 3 and 7pm the day before your surgery with your arrival time. (If your surgery is on a Monday, we will call you the Friday before.)    Call [phone] after 7pm Monday-Friday if you did not receive this call. INSTRUCTIONS BEFORE YOUR SURGERY   When You  Arrive Arrive at the [address] [facility] on the day of your surgery  Have your insurance card, photo ID, and any copayment (if needed)   Food   and   Drink NO food or drink after midnight the night before surgery    This means NO water, gum, mints, coffee, juice, etc.  No alcohol (beer, wine, liquor) 24 hours before and after surgery   Medications to   TAKE   Morning of Surgery MEDICATIONS TO TAKE THE MORNING OF SURGERY WITH A SIP OF WATER:    Metoprolol   Pantoprazole   Medications  To  STOP      7 days before surgery  Non-Steroidal anti-inflammatory Drugs (NSAID's): for example, Ibuprofen (Advil, Motrin), Naproxen (Aleve)   Aspirin, if taking for pain    Herbal supplements, vitamins, and fish oil   Other:  (Pain medications not listed above, including Tylenol may be taken)   Blood  Thinners    Bathing Clothing  Jewelry  Valuables      If you shower the morning of surgery, please do not apply anything to your skin (lotions, powders, deodorant, or makeup, especially mascara)   Follow Chlorhexidine Care Fusion body wash instructions provided to you during PAT appointment. Begin 3 days prior to surgery.    Do not shave or trim anywhere 24 hours before surgery   Wear your hair loose or down; no pony-tails, buns, or metal hair clips   Wear loose, comfortable, clean clothes   Wear glasses instead of contacts   Leave money, valuables, and jewelry, including body piercings, at home       Going Home - or Spending the Night  SAME-DAY SURGERY: You must have a responsible adult drive you home and stay with you 24 hours after surgery   ADMITS: If your doctor is keeping you in the hospital after surgery, leave personal belongings/luggage in your car until you have a hospital room number. Hospital discharge time is 12 noon  Drivers must be here before 12 noon unless you are told differently   Special Instructions        Follow all instructions so your surgery wont be cancelled. Please, be on time. If a situation occurs and you are delayed the day of surgery, call [phone]. If your physical condition changes (like a fever, cold, flu, etc.) call your surgeon. Home medication(s) reviewed and verified via   LIST   VERBAL   during PAT appointment. The patient was contacted by    IN-PERSON  The patient verbalizes understanding of all instructions and   DOES NOT   need reinforcement.

## 2022-04-05 LAB
ATRIAL RATE: 75 BPM
CALCULATED P AXIS, ECG09: 59 DEGREES
CALCULATED R AXIS, ECG10: 3 DEGREES
CALCULATED T AXIS, ECG11: 58 DEGREES
DIAGNOSIS, 93000: NORMAL
P-R INTERVAL, ECG05: 200 MS
Q-T INTERVAL, ECG07: 372 MS
QRS DURATION, ECG06: 68 MS
QTC CALCULATION (BEZET), ECG08: 415 MS
VENTRICULAR RATE, ECG03: 75 BPM

## 2022-04-08 ENCOUNTER — ANESTHESIA EVENT (OUTPATIENT)
Dept: SURGERY | Age: 57
End: 2022-04-08
Payer: COMMERCIAL

## 2022-04-11 ENCOUNTER — HOSPITAL ENCOUNTER (OUTPATIENT)
Age: 57
Setting detail: OUTPATIENT SURGERY
Discharge: HOME | End: 2022-04-11
Attending: ORTHOPAEDIC SURGERY | Admitting: ORTHOPAEDIC SURGERY
Payer: COMMERCIAL

## 2022-04-11 ENCOUNTER — ANESTHESIA (OUTPATIENT)
Dept: SURGERY | Age: 57
End: 2022-04-11
Payer: COMMERCIAL

## 2022-04-11 VITALS
SYSTOLIC BLOOD PRESSURE: 120 MMHG | OXYGEN SATURATION: 98 % | TEMPERATURE: 97.8 F | DIASTOLIC BLOOD PRESSURE: 67 MMHG | HEART RATE: 72 BPM | RESPIRATION RATE: 14 BRPM

## 2022-04-11 DIAGNOSIS — R52 PAIN: Primary | ICD-10-CM

## 2022-04-11 PROCEDURE — 77030016677 HC BUR SHV ABRD S&N -B: Performed by: ORTHOPAEDIC SURGERY

## 2022-04-11 PROCEDURE — 77030019974 HC FRCP GRSP SUT J&J -C: Performed by: ORTHOPAEDIC SURGERY

## 2022-04-11 PROCEDURE — 2709999900 HC NON-CHARGEABLE SUPPLY: Performed by: ORTHOPAEDIC SURGERY

## 2022-04-11 PROCEDURE — 76210000021 HC REC RM PH II 0.5 TO 1 HR: Performed by: ORTHOPAEDIC SURGERY

## 2022-04-11 PROCEDURE — 74011250636 HC RX REV CODE- 250/636: Performed by: ANESTHESIOLOGY

## 2022-04-11 PROCEDURE — 74011250636 HC RX REV CODE- 250/636: Performed by: ORTHOPAEDIC SURGERY

## 2022-04-11 PROCEDURE — 76210000063 HC OR PH I REC FIRST 0.5 HR: Performed by: ORTHOPAEDIC SURGERY

## 2022-04-11 PROCEDURE — 77030013234 HC TRACT SHLDR KT BIOM -B: Performed by: ORTHOPAEDIC SURGERY

## 2022-04-11 PROCEDURE — 77030006877 HC BLD SHV FLL RAD S&N -B: Performed by: ORTHOPAEDIC SURGERY

## 2022-04-11 PROCEDURE — C9290 INJ, BUPIVACAINE LIPOSOME: HCPCS | Performed by: ANESTHESIOLOGY

## 2022-04-11 PROCEDURE — 77030010816: Performed by: ORTHOPAEDIC SURGERY

## 2022-04-11 PROCEDURE — 77030018834: Performed by: ORTHOPAEDIC SURGERY

## 2022-04-11 PROCEDURE — 74011000250 HC RX REV CODE- 250: Performed by: ANESTHESIOLOGY

## 2022-04-11 PROCEDURE — 77030040922 HC BLNKT HYPOTHRM STRY -A

## 2022-04-11 PROCEDURE — 77030002916 HC SUT ETHLN J&J -A: Performed by: ORTHOPAEDIC SURGERY

## 2022-04-11 PROCEDURE — 77030014154 HC WRP CLD THER BREG -C: Performed by: ORTHOPAEDIC SURGERY

## 2022-04-11 PROCEDURE — 77030011390 HC GRSP SUT IDL J&J -C: Performed by: ORTHOPAEDIC SURGERY

## 2022-04-11 PROCEDURE — 77030003601 HC NDL NRV BLK BBMI -A

## 2022-04-11 PROCEDURE — 77030012711 HC WND ARTHRO ABLT S&N -D: Performed by: ORTHOPAEDIC SURGERY

## 2022-04-11 PROCEDURE — 77030040361 HC SLV COMPR DVT MDII -B

## 2022-04-11 PROCEDURE — 77030008496 HC TBNG ARTHSC IRR S&N -B: Performed by: ORTHOPAEDIC SURGERY

## 2022-04-11 PROCEDURE — 74011000258 HC RX REV CODE- 258: Performed by: ORTHOPAEDIC SURGERY

## 2022-04-11 PROCEDURE — 74011250636 HC RX REV CODE- 250/636: Performed by: NURSE ANESTHETIST, CERTIFIED REGISTERED

## 2022-04-11 PROCEDURE — C1713 ANCHOR/SCREW BN/BN,TIS/BN: HCPCS | Performed by: ORTHOPAEDIC SURGERY

## 2022-04-11 PROCEDURE — 77030032497 HC WRP SHLDR WO BGS SOLM -B

## 2022-04-11 PROCEDURE — 76010000149 HC OR TIME 1 TO 1.5 HR: Performed by: ORTHOPAEDIC SURGERY

## 2022-04-11 PROCEDURE — 76060000033 HC ANESTHESIA 1 TO 1.5 HR: Performed by: ORTHOPAEDIC SURGERY

## 2022-04-11 DEVICE — IMPLANTABLE DEVICE
Type: IMPLANTABLE DEVICE | Status: FUNCTIONAL
Brand: JUGGERKNOT SOFT ANCHORS

## 2022-04-11 RX ORDER — LIDOCAINE HYDROCHLORIDE 10 MG/ML
0.1 INJECTION, SOLUTION EPIDURAL; INFILTRATION; INTRACAUDAL; PERINEURAL AS NEEDED
Status: DISCONTINUED | OUTPATIENT
Start: 2022-04-11 | End: 2022-04-11 | Stop reason: HOSPADM

## 2022-04-11 RX ORDER — SODIUM CHLORIDE 0.9 % (FLUSH) 0.9 %
5-40 SYRINGE (ML) INJECTION EVERY 8 HOURS
Status: DISCONTINUED | OUTPATIENT
Start: 2022-04-11 | End: 2022-04-11 | Stop reason: HOSPADM

## 2022-04-11 RX ORDER — SODIUM CHLORIDE, SODIUM LACTATE, POTASSIUM CHLORIDE, CALCIUM CHLORIDE 600; 310; 30; 20 MG/100ML; MG/100ML; MG/100ML; MG/100ML
75 INJECTION, SOLUTION INTRAVENOUS CONTINUOUS
Status: DISCONTINUED | OUTPATIENT
Start: 2022-04-11 | End: 2022-04-11 | Stop reason: HOSPADM

## 2022-04-11 RX ORDER — PROPOFOL 10 MG/ML
INJECTION, EMULSION INTRAVENOUS AS NEEDED
Status: DISCONTINUED | OUTPATIENT
Start: 2022-04-11 | End: 2022-04-11 | Stop reason: HOSPADM

## 2022-04-11 RX ORDER — SODIUM CHLORIDE, SODIUM LACTATE, POTASSIUM CHLORIDE, CALCIUM CHLORIDE 600; 310; 30; 20 MG/100ML; MG/100ML; MG/100ML; MG/100ML
125 INJECTION, SOLUTION INTRAVENOUS CONTINUOUS
Status: DISCONTINUED | OUTPATIENT
Start: 2022-04-11 | End: 2022-04-11 | Stop reason: HOSPADM

## 2022-04-11 RX ORDER — DEXAMETHASONE SODIUM PHOSPHATE 4 MG/ML
INJECTION, SOLUTION INTRA-ARTICULAR; INTRALESIONAL; INTRAMUSCULAR; INTRAVENOUS; SOFT TISSUE AS NEEDED
Status: DISCONTINUED | OUTPATIENT
Start: 2022-04-11 | End: 2022-04-11 | Stop reason: HOSPADM

## 2022-04-11 RX ORDER — OXYCODONE AND ACETAMINOPHEN 5; 325 MG/1; MG/1
1 TABLET ORAL
Qty: 42 TABLET | Refills: 0 | Status: SHIPPED
Start: 2022-04-11 | End: 2022-04-18

## 2022-04-11 RX ORDER — HYDROMORPHONE HYDROCHLORIDE 1 MG/ML
.25-1 INJECTION, SOLUTION INTRAMUSCULAR; INTRAVENOUS; SUBCUTANEOUS
Status: DISCONTINUED | OUTPATIENT
Start: 2022-04-11 | End: 2022-04-11 | Stop reason: HOSPADM

## 2022-04-11 RX ORDER — DIPHENHYDRAMINE HYDROCHLORIDE 50 MG/ML
12.5 INJECTION, SOLUTION INTRAMUSCULAR; INTRAVENOUS AS NEEDED
Status: DISCONTINUED | OUTPATIENT
Start: 2022-04-11 | End: 2022-04-11 | Stop reason: HOSPADM

## 2022-04-11 RX ORDER — SODIUM CHLORIDE 0.9 % (FLUSH) 0.9 %
5-40 SYRINGE (ML) INJECTION AS NEEDED
Status: DISCONTINUED | OUTPATIENT
Start: 2022-04-11 | End: 2022-04-11 | Stop reason: HOSPADM

## 2022-04-11 RX ORDER — PANTOPRAZOLE SODIUM 40 MG/1
40 TABLET, DELAYED RELEASE ORAL DAILY
COMMUNITY

## 2022-04-11 RX ORDER — FENTANYL CITRATE 50 UG/ML
INJECTION, SOLUTION INTRAMUSCULAR; INTRAVENOUS AS NEEDED
Status: DISCONTINUED | OUTPATIENT
Start: 2022-04-11 | End: 2022-04-11 | Stop reason: HOSPADM

## 2022-04-11 RX ORDER — MIDAZOLAM HYDROCHLORIDE 1 MG/ML
INJECTION, SOLUTION INTRAMUSCULAR; INTRAVENOUS AS NEEDED
Status: DISCONTINUED | OUTPATIENT
Start: 2022-04-11 | End: 2022-04-11 | Stop reason: HOSPADM

## 2022-04-11 RX ORDER — ASPIRIN 325 MG
325 TABLET ORAL 2 TIMES DAILY WITH MEALS
Qty: 28 TABLET | Refills: 0 | Status: SHIPPED
Start: 2022-04-11 | End: 2022-04-25

## 2022-04-11 RX ORDER — ONDANSETRON 2 MG/ML
4 INJECTION INTRAMUSCULAR; INTRAVENOUS AS NEEDED
Status: DISCONTINUED | OUTPATIENT
Start: 2022-04-11 | End: 2022-04-11 | Stop reason: HOSPADM

## 2022-04-11 RX ORDER — BUPIVACAINE HYDROCHLORIDE 2.5 MG/ML
INJECTION, SOLUTION EPIDURAL; INFILTRATION; INTRACAUDAL AS NEEDED
Status: DISCONTINUED | OUTPATIENT
Start: 2022-04-11 | End: 2022-04-11 | Stop reason: HOSPADM

## 2022-04-11 RX ORDER — SODIUM CHLORIDE, SODIUM LACTATE, POTASSIUM CHLORIDE, CALCIUM CHLORIDE 600; 310; 30; 20 MG/100ML; MG/100ML; MG/100ML; MG/100ML
100 INJECTION, SOLUTION INTRAVENOUS CONTINUOUS
Status: DISCONTINUED | OUTPATIENT
Start: 2022-04-11 | End: 2022-04-11 | Stop reason: HOSPADM

## 2022-04-11 RX ADMIN — MIDAZOLAM 2 MG: 1 INJECTION, SOLUTION INTRAMUSCULAR; INTRAVENOUS at 13:18

## 2022-04-11 RX ADMIN — PROPOFOL 200 MG: 10 INJECTION, EMULSION INTRAVENOUS at 14:37

## 2022-04-11 RX ADMIN — BUPIVACAINE 10 ML: 13.3 INJECTION, SUSPENSION, LIPOSOMAL INFILTRATION at 13:22

## 2022-04-11 RX ADMIN — SODIUM CHLORIDE, POTASSIUM CHLORIDE, SODIUM LACTATE AND CALCIUM CHLORIDE 100 ML/HR: 600; 310; 30; 20 INJECTION, SOLUTION INTRAVENOUS at 11:53

## 2022-04-11 RX ADMIN — CEFAZOLIN SODIUM 0.2 G: 10 INJECTION, POWDER, FOR SOLUTION INTRAVENOUS at 11:53

## 2022-04-11 RX ADMIN — DEXAMETHASONE SODIUM PHOSPHATE 8 MG: 4 INJECTION, SOLUTION INTRAMUSCULAR; INTRAVENOUS at 14:28

## 2022-04-11 RX ADMIN — FENTANYL CITRATE 50 MCG: 50 INJECTION, SOLUTION INTRAMUSCULAR; INTRAVENOUS at 13:19

## 2022-04-11 RX ADMIN — PROPOFOL 50 MG: 10 INJECTION, EMULSION INTRAVENOUS at 14:39

## 2022-04-11 RX ADMIN — SODIUM CHLORIDE, POTASSIUM CHLORIDE, SODIUM LACTATE AND CALCIUM CHLORIDE: 600; 310; 30; 20 INJECTION, SOLUTION INTRAVENOUS at 15:47

## 2022-04-11 RX ADMIN — FENTANYL CITRATE 50 MCG: 50 INJECTION, SOLUTION INTRAMUSCULAR; INTRAVENOUS at 13:18

## 2022-04-11 RX ADMIN — BUPIVACAINE HYDROCHLORIDE 10 ML: 2.5 INJECTION, SOLUTION EPIDURAL; INFILTRATION; INTRACAUDAL; PERINEURAL at 13:22

## 2022-04-11 NOTE — ANESTHESIA PREPROCEDURE EVALUATION
Relevant Problems   No relevant active problems       Anesthetic History   No history of anesthetic complications            Review of Systems / Medical History  Patient summary reviewed, nursing notes reviewed and pertinent labs reviewed    Pulmonary  Within defined limits                 Neuro/Psych   Within defined limits           Cardiovascular    Hypertension                Comments: Mitral valve prolapse   GI/Hepatic/Renal     GERD: well controlled           Endo/Other  Within defined limits           Other Findings              Physical Exam    Airway  Mallampati: I  TM Distance: 4 - 6 cm  Neck ROM: normal range of motion   Mouth opening: Normal     Cardiovascular    Rhythm: regular  Rate: abnormal         Dental    Dentition: Lower dentition intact and Upper dentition intact     Pulmonary  Breath sounds clear to auscultation               Abdominal         Other Findings            Anesthetic Plan    ASA: 2  Anesthesia type: regional and general - interscalene block      Post-op pain plan if not by surgeon: peripheral nerve block single    Induction: Intravenous  Anesthetic plan and risks discussed with: Patient      .

## 2022-04-11 NOTE — ANESTHESIA POSTPROCEDURE EVALUATION
Procedure(s):  RIGHT ARTHROSCOPIC  ROTATOR CUFF REPAIR WITH SUBACROMIAL DECOMPRESSION AND DISTAL CLAVICLE EXCISION. regional, general    Anesthesia Post Evaluation      Multimodal analgesia: multimodal analgesia not used between 6 hours prior to anesthesia start to PACU discharge  Patient location during evaluation: PACU  Patient participation: complete - patient participated  Level of consciousness: awake  Pain management: adequate  Airway patency: patent  Anesthetic complications: no  Cardiovascular status: acceptable, blood pressure returned to baseline and hemodynamically stable  Respiratory status: acceptable  Hydration status: acceptable  Post anesthesia nausea and vomiting:  controlled      INITIAL Post-op Vital signs:   Vitals Value Taken Time   /67 04/11/22 1550   Temp 36.5 °C (97.7 °F) 04/11/22 1548   Pulse 74 04/11/22 1635   Resp 13 04/11/22 1635   SpO2 97 % 04/11/22 1635   Vitals shown include unvalidated device data.

## 2022-04-11 NOTE — ANESTHESIA PROCEDURE NOTES
Peripheral Block    Start time: 4/11/2022 1:18 PM  End time: 4/11/2022 1:22 PM  Performed by: Neel King MD  Authorized by: Neel King MD       Pre-procedure: Indications: at surgeon's request and post-op pain management    Preanesthetic Checklist: patient identified, risks and benefits discussed, site marked, timeout performed, anesthesia consent given and patient being monitored    Timeout Time: 13:18 EDT          Block Type:   Block Type:   Interscalene  Laterality:  Right  Monitoring:  Continuous pulse ox, frequent vital sign checks, heart rate, responsive to questions and oxygen  Injection Technique:  Single shot  Procedures: ultrasound guided    Patient Position: supine  Prep: chlorhexidine    Location:  Interscalene  Needle Type:  Stimuplex  Needle Gauge:  22 G  Needle Localization:  Ultrasound guidance  Medication Injected:  Bupivacaine liposome (PF) susp (EXPAREL) infiltration, 10 mL  Med Admin Time: 4/11/2022 1:22 PM    Assessment:  Number of attempts:  1  Injection Assessment:  Incremental injection every 5 mL, local visualized surrounding nerve on ultrasound, negative aspiration for blood, no paresthesia and no intravascular symptoms  Patient tolerance:  Patient tolerated the procedure well with no immediate complications

## 2022-04-11 NOTE — INTERVAL H&P NOTE
Date of Surgery Update:  Ricardo Marin was seen and examined. History and physical has been reviewed. The patient has been examined. There have been no significant clinical changes since the completion of the originally dated History and Physical.    The patient was counseled at length about the risks of patrick Covid-19 during their perioperative period and any recovery window from their procedure. The patient was made aware that patrick Covid-19  may worsen their prognosis for recovering from their procedure and lend to a higher morbidity and/or mortality risk. All material risks, benefits, and reasonable alternatives including postponing the procedure were discussed. The patient does  wish to proceed with the procedure at this time.         Signed By: Yuly Feliciano MD     April 11, 2022 12:19 PM

## 2022-04-11 NOTE — DISCHARGE INSTRUCTIONS
Arthroscopic Rotator Cuff Repair  Discharge Instructions  Dr. Linda Orellana    Please take the time to review the following instructions before you leave the hospital.  Shane Levi should use them as guidelines during your recovery from surgery.  If you have any questions, you may contact my staff directly at [phone].  We are in clinic every morning and will be begin answering voicemails after 12:30pm.   If you need immediate assistance, call the main office number [phone].  The most efficient means of communication with the office is through [address] Wound Care / Dressing Changes:  Beginning 2 days after your surgery, you should remove your bulky dressing. A big, bulky dressing isnt necessary as long as there is no drainage from the incisions. You can put a band-aid over each incision. Do not apply antibiotic ointment to your incisions. Han Blakes / Bathing: You may shower 2 days after your surgery. Your band-aids may be removed for showering. You may get your incisions wet in the shower. Do not vigorously scrub your incisions. Do not take a bath or get into a swimming pool or Jacuzzi until you follow up with Dr. Tawanna Mccrary. Do not soak your incisions under water. Sling:  Keep your arm in the sling at all times except when showering, changing your clothes, or doing the exercises shown to you by Dr. Dheeraj Alex PA-C, or your physical therapist.  May Deepa your arm at your side when changing your clothes and showering. Please call the [address] [place] [address] Department with any questions about your sling ([phone]). Ice and Elevation:  Ice therapy should be used consistently for 48 hours after surgery. Subsequently, you should ice 3 times per day for 20 minutes at a time. After the first week, you may use ice as needed for pain and swelling.  Please ensure there is protective barrier between your skin and the ice.      Diet:  You may advance your regular diet as tolerated. Increase your clear liquid intake for the next 2-3 days. Physical Therapy:  You must begin therapy within in 3-4 days of your surgery. If you already have a therapy appointment, please be sure to attend your sessions as scheduled. If you do not have physical therapy scheduled, please call Dr. Mark Arellano office to set up your first appointment as soon as possible. You should also begin the exercises on the attached sheet the day after your surgery. Medications:   [facility]  Your prescriptions were sent to the pharmacy on file. We are unable to change the  narcotic prescription that has already been sent today. If you would like to change your pharmacy for FUTURE prescriptions, please call the office. 1. You were prescribed Aspirin 325mg twice daily for 14 days to prevent a blood clot. If you were prescribed a blood thinner, Xarelto 10mg, you should take it as prescribed. Do not take Aspirin with the Xarelto. Do not take additional NSAIDs while taking the Aspirin. 2. You were prescribed a narcotic medication for pain control. Please use the medications as prescribed. Pain medications may cause constipation - Colace or Milk of Magnesia may be used as needed. Other possible side effects of pain medications are dizziness, headache, nausea, vomiting, and urinary retention. Discontinue the pain medication if you develop itching, rash, shortness of breath, or difficulties swallowing. If these symptoms become severe or arent relieved by discontinuing the medication, you should seek immediate medical attention. You cannot drive or operate machinery while taking narcotic medication. Refills of pain medication are authorized during office hours only   (8AM - 5PM Monday through Friday)    3. Some pain medications already contain Tylenol/Acetaminophen.   Please read your prescription pain medicine bottle prior to taking additional Tylenol. Do not exceed 3000mg of Tylenol/Acetaminophen in a 24 hour period. 4. You may resume other medications that you were taking prior to your surgery. Pain medication may change the effects of any antidepressant medication you may be taking. If you have any questions about possible interactions between your regular medication and the pain medication, you should consult the physician who prescribes your regular medications. Follow up appointment:  Please follow up at your scheduled appointment 10-14 days from the day of your surgery. If you do not already have an appointment, please call our office at [phone] for your follow up appointment. Your appointment will likely be with Angelica Ruth PA-C. [facility] assists Dr. Keenan Pierce in surgery and will be able to review your operation and answer your questions. Important Signs and Symptoms:  If you experience any of the following signs and symptoms, you should contact Dr. Rozina Mcduffie office. Please be advised if a problem arises which you feel requires immediate medical attention or you are unable to contact Dr. Rozina Mcduffie office, you should seek immediate medical attention. If it is after 5pm, please call the main office number [phone] for medical emergencies. Signs and symptoms to watch for include:  1. A sudden increase in swelling and/or redness or warmth at the area of your surgery which isnt relieved by rest, ice, and elevation. 2. Oral temperature greater than 101degrees for 12 hours or more which isnt relieved by an increase in fluid intake and taking Tylenol. 3. Excessive drainage from your incisions or drainage which hasnt stopped by 72 hours after your surgery. 4. Calf pain, fever, chills, shortness of breath, chest pain, nausea, vomiting or other signs and symptoms which are of concern to you. Exercises after Shoulder Surgery  1.  Passive Forward Flexion: Instructions:  - Erick Bar on your back  - Take your non surgical arm and grab the wrist of your surgical arm from the bottom  - Use your non surgical arm to lift your surgical arm over your head with the elbow straight   - Slowly return your arm to your side using your non surgical arm  - Repeat 20 times in the morning and 20 times in the evening  Remember:  - Passive motion: Your arm is lifted with the help of your other hand. o The repair is protected when you do passive motion  - Active motion: You lift your arm by using your shoulder muscles. o DO NOT DO ANY ACTIVE MOTION FOR NOW      2. Codman Pendulum Exercises                                     Instructions:  - Bend forward about 90° at the waist and support yourself on a sturdy object with your non surgical arm  (bend slightly at the knees to protect your back)  - Gently allow your surgical arm to hang towards the ground  - Move your hips forward and backward allowing your arm to swing slightly  - Arm can move forward, backward, and in small circles (clockwise and counterclockwise)  o Remember: let your body move your arm, do not use your arm muscles  - Begin performing the exercise for about 30 seconds. Progress to 2 minutes. - Repeat 2-3 times per day               Going Home After A  Peripheral Nerve Block    Patient Information    The anesthesiology team has provided for your pain control through a technique called regional anesthesia. As the name implies, anesthesia (decreased or no pain, sensation, or motor control) has been provided to a specific region, whether that be an arm or a leg. How does this happen?  you might ask.     While you were sleepy, the anesthesia provider provided medicine to a specific group of nerves either in the neck/shoulder region or in the groin and/or buttock region, similar to the way a dentist might numb a tooth (teeth.)  They typically use an ultrasound machine to know where the medicine is placed in relation to the nerves they wish to numb up or block.   What this means is that for the next few hours, you should expect to have a numb limb. Below are some things we wish for you to read and be familiar with concerning your anesthetized limb. Caring For a Blocked Body Part    General Considerations:   The numbness may last up to 24 hours   You must protect your arm or leg. The blocked extremity is numb, weak, and difficult for your brain to locate and communicate with. To do this you should:  o Pay attention to the position of the blocked limb at all times. o Be very careful when placing hot or cod items on a numb limb. You could cause tissue damage like burns or frostbite if you are unable to feel temperature. Carefully follow your discharge instructions regarding the use of these therapies in you post-operative care. o Carefully pad the affected limb. Normally we continually move and adjust the position of our bodies without thinking about it. This movement and continuous repositioning helps to prevent injuries from immobility. When a limb is numb it still requires this care  o Be extremely careful not to bump or hit the numb body part. This can result in an unrecognized injury, at lease until the blocked limb wakes up. It can also result in worse pain of your already post-surgical limb. Arm/Shoulder Blocks:   You may experience a droopy eyelid, nasal stuffiness, and redness of the eye after receiving an arm/shoulder block. This is called Joses Syndrome, and is very common. There is no need for concern. You may also experience mild hoarseness, but all of these symptoms should resolve within 24 hours.  Some patients may experience mild shortness of breath. A sitting position will help alleviate this and it should resolve within 24 hours. If you experience significant or progressive worsening of the shortness of breath, seek medical attention immediately.     Knee/Foot Blocks:   DO NOT use the blocked leg to walk, balance or support yourself. Your leg will not be able to balance your weight properly while any part of your leg is numb. You are at a RISK for [address].  Be careful not to drag your foot along the ground or stub your toes. Contact Phone Numbers    CALL 911 IN CASE OF AN EMERGENCY. For all other non-emergency inquiries call the [facility]  at [phone] and ask for the anesthesiologist on call to be paged. Cold Therapy Instructions    Your nurse will provide a cold therapy wrap based upon your surgery, need, and your doctor's orders. INSTRUCTIONS FOR USE:  All cooling wraps produce sustained periods of intense cold. NEVER PLACE PAD ON BARE SKIN OR HAVE CONTACT WITH BARE SKIN  Always Use An Insulating Barrier. Tissue injury can occur if these devices are used improperly. Follow your surgeons instructions carefully regarding the frequency, duration and breaks from cold therapy, and the total length of treatment. Check under the pad barrier every 2 hours for skin injury (see below.)      SIGNS OF SKIN INJURY:  STOP USE AND CONTACT YOUR SURGEON if any of the following occur: Increased pain, burning, increased swelling, itching, blisters, increased redness, discoloration, welts, or other change in skin condition. INDICATIONS:  Back, Hip, Knee or Shoulder Surgery and Post-Operative Treatment; Trauma; Orthopedic Rehabilitation      CONTRAINDICATIONS:  Cold therapy should not be used by persons with Diabetes, Raynaud's or other vasospastic disease, cold hypersensititvity, or compromised local circulation. Certain medical conditions make cold-induced injury more likely. Please consult with your healthcare provider before use.             DISCHARGE SUMMARY from your Nurse      PATIENT INSTRUCTIONS    After general anesthesia or intravenous sedation, for 24 hours or while taking prescription Narcotics:  · Limit your activities  · Do not drive and operate hazardous machinery  · Do not make important personal or business decisions  · Do  not drink alcoholic beverages  · If you have not urinated within 8 hours after discharge, please contact your surgeon on call. Report the following to your surgeon:  · Excessive pain, swelling, redness or odor of or around the surgical area  · Temperature over 100.5  · Nausea and vomiting lasting longer than 4 hours or if unable to take medications  · Any signs of decreased circulation or nerve impairment to extremity: change in color, persistent  numbness, tingling, coldness or increase pain  · Any questions      GOOD HELP TO FIGHT AN INFECTION  Here are a few tip to help reduce the chance of getting an infection after surgery:   Wash Your Hands   Good handwashing is the most important thing you and your caregiver can do.  Wash before and after caring for any wounds. Dry your hand with a clean towel.  Wash with soap and water for at least 20 seconds. A TIP: sing the \"Happy Birthday\" song through one time while washing to help with the timing.  Use a hand  in between washings.  Shower   When your surgeon says it is OK to take a shower, use a new bar of antibacterial soap (if that is what you use, and keep that bar of soap ONLY for your use), or antibacterial body wash.  Use a clean wash cloth or sponge when you bathe.  Dry off with a clean towel  after every bath - be careful around any wounds, skin staples, sutures or surgical glue over/on wounds.  Do not enter swimming pools, hot tubs, lakes, rivers and/or ocean until wounds are healed and your doctor/surgeon says it is OK.  Use Clean Sheets   Sleep on freshly laundered sheets after your surgery.  Keep the surgery site covered with a clean, dry bandage (if instructed to do so). If the bandage becomes soiled, reapply a new, dry, clean bandage.     Do not allow pets to sleep with you while your wound is healing.  Lifestyle Modification and Controlling Your Blood Sugar   Smoking slows wound healing. Stop smoking and limit exposure to second-hand smoke.  High blood sugar slows wound healing. Eat a well-balanced diet to provide proper nutrition while healing   Monitor your blood sugar (if you are a diabetic) and take your medications as you are suppose to so you can control you blood sugar after surgery. COUGH AND DEEP BREATHE    Breathing deeply and coughing are very important exercises to do after surgery. Deep breathing and coughing open the little air tubes and air sacks in your lungs. You take deep breaths every day. You may not even notice - it is just something you do when you sigh or yawn. It is a natural exercise you do to keep these air passages open. After surgery, take deep breaths and cough, on purpose. DIRECTIONS:  · Take 10 to 15 slow deep breaths every hour while awake. · Breathe in deeply, and hold it for 2 seconds. · Exhale slowly through puckered lips, like blowing up a balloon. · After every 4th or 5th deep breath, hug your pillow to your chest or belly and give a hard, deep cough. Yes, it will probably hurt. But doing this exercise is a very important part of healing after surgery. Take your pain medicine to help you do this exercise without too much pain. Coughing and deep breathing help prevent bronchitis and pneumonia after surgery. If you had chest or belly surgery, use a pillow as a \"hug shavon\" and hold it tightly to your chest or belly when you cough. ANKLE PUMPS    Ankle pumps increase the circulation of oxygenated blood to your lower extremities and decrease your risk for circulation problems such as blood clots. They also stretch the muscles, tendons and ligaments in your foot and ankle, and prevent joint contracture in the ankle and foot, especially after surgeries on the legs.     It is important to do ankle pump exercises regularly after surgery because immobility increases your risk for developing a blood clot. Your doctor may also have you take an Aspirin for the next few days as well. If your doctor did not ask you to take an Aspirin, consult with him before starting Aspirin therapy on your own. The exercise is quite simple. · Slowly point your foot forward, feeling the muscles on the top of your lower leg stretch, and hold this position for 5 seconds. · Next, pull your foot back toward you as far as possible, stretching the calf muscles, and hold that position for 5 seconds. · Repeat with the other foot. · Perform 10 repetitions every hour while awake for both ankles if possible (down and then up with the foot once is one repetition). You should feel gentle stretching of the muscles in your lower leg when doing this exercise. If you feel pain, or your range of motion is limited, don't push too hard. Only go the limit your joint and muscles will let you go. If you have increasing pain, progressively worsening leg warmth or swelling, STOP the exercise and call your doctor. MEDICATION AND   SIDE EFFECT GUIDE    The [address] [place] [address] MEDICATION AND SIDE EFFECT GUIDE was provided to the PATIENT AND CARE PROVIDER. Information provided includes instruction about drug purpose and common side effects for the following medications:   · ***        These are general instructions for a healthy lifestyle:    *   Please give a list of your current medications to your Primary Care Provider. *   Please update this list whenever your medications are discontinued, doses are changed, or new medications (including over-the-counter products) are added. *   Please carry medication information at all times in case of emergency situations.       About Smoking  No smoking / No tobacco products  Avoid exposure to second hand smoke     Surgeon General's Warning:  Quitting smoking now greatly reduces serious risk to your health. Obesity, smoking, and sedentary lifestyle greatly increases your risk for illness and disease. A healthy diet, regular physical exercise & weight monitoring are important for maintaining a healthy lifestyle. Congestive Heart Failure  You may be retaining fluid if you have a history of heart failure or if you experience any of the following symptoms:  Weight gain of 3 pounds or more overnight or 5 pounds in a week, increased swelling in your hands or feet or shortness of breath while lying flat in bed. Please call your doctor as soon as you notice any of these symptoms; do not wait until your next office visit. Recognize signs and symptoms of STROKE:  F -  Face looks uneven  A -  Arms unable to move or move evenly  S -  Speech slurred or non-existent  T -  Time-call 911 as soon as signs and symptoms begin-DO NOT go          back to bed or wait to see if you get better-TIME IS BRAIN. Warning Signs of HEART ATTACK   Call 911 if you have these symptoms:     Chest discomfort. Most heart attacks involve discomfort in the center of the chest that lasts more than a few minutes, or that goes away and comes back. It can feel like uncomfortable pressure, squeezing, fullness, or pain.  Discomfort in other areas of the upper body. Symptoms can include pain or discomfort in one or both arms, the back, neck, jaw, or stomach.  Shortness of breath with or without chest discomfort.  Other signs may include breaking out in a cold sweat, nausea, or lightheadedness. Don't wait more than five minutes to call 911 - MINUTES MATTER! Fast action can save your life. Calling 911 is almost always the fastest way to get lifesaving treatment. Emergency Medical Services staff can begin treatment when they arrive -- up to an hour sooner than if someone gets to the hospital by car. Learning About Coronavirus [phone])  Coronavirus [phone]): Overview  What is coronavirus (COVID-19)?   The coronavirus disease (COVID-19) is caused by a virus. It is an illness that was first found in [facility], in December 2019. It has since spread worldwide. The virus can cause fever, cough, and trouble breathing. In severe cases, it can cause pneumonia and make it hard to breathe without help. It can cause death. Coronaviruses are a large group of viruses. They cause the common cold. They also cause more serious illnesses like Middle East respiratory syndrome (MERS) and severe acute respiratory syndrome (SARS). COVID-19 is caused by a novel coronavirus. That means it's a new type that has not been seen in people before. This virus spreads person-to-person through droplets from coughing and sneezing. It can also spread when you are close to someone who is infected. And it can spread when you touch something that has the virus on it, such as a doorknob or a tabletop. What can you do to protect yourself from coronavirus (COVID-19)? The best way to protect yourself from getting sick is to:  · Avoid areas where there is an outbreak. · Avoid contact with people who may be infected. · Wash your hands often with soap or alcohol-based hand sanitizers. · Avoid crowds and try to stay at least 6 feet away from other people. · Wash your hands often, especially after you cough or sneeze. Use soap and water, and scrub for at least 20 seconds. If soap and water aren't available, use an alcohol-based hand . · Avoid touching your mouth, nose, and eyes. What can you do to avoid spreading the virus to others? To help avoid spreading the virus to others:  · Cover your mouth with a tissue when you cough or sneeze. Then throw the tissue in the trash. · Use a disinfectant to clean things that you touch often. · Stay home if you are sick or have been exposed to the virus. Don't go to school, work, or public areas. And don't use public transportation.   · If you are sick:  ? Leave your home only if you need to get medical care. But call the doctor's office first so they know you're coming. And wear a face mask, if you have one.  ? If you have a face mask, wear it whenever you're around other people. It can help stop the spread of the virus when you cough or sneeze. ? Clean and disinfect your home every day. Use household  and disinfectant wipes or sprays. Take special care to clean things that you grab with your hands. These include doorknobs, remote controls, phones, and handles on your refrigerator and microwave. And don't forget countertops, tabletops, bathrooms, and computer keyboards. When to call for help  Call 911 anytime you think you may need emergency care. For example, call if:  · You have severe trouble breathing. (You can't talk at all.)  · You have constant chest pain or pressure. · You are severely dizzy or lightheaded. · You are confused or can't think clearly. · Your face and lips have a blue color. · You pass out (lose consciousness) or are very hard to wake up. Call your doctor now if you develop symptoms such as:  · Shortness of breath. · Fever. · Cough. If you need to get care, call ahead to the doctor's office for instructions before you go. Make sure you wear a face mask, if you have one, to prevent exposing other people to the virus. Where can you get the latest information? The following health organizations are tracking and studying this virus. Their websites contain the most up-to-date information. [facility] also learn what to do if you think you may have been exposed to the virus. · U.S. Centers for Disease Control and Prevention (CDC): The CDC provides updated news about the disease and travel advice. The website also tells you how to prevent the spread of infection. www.cdc.gov  · World Health Organization [facility]): WHO offers information about the virus outbreaks.  WHO also has travel advice. www.who.int  Current as of: April 1, 2020               Content Version: 12.4  © 8005-7918 [employer]. Care instructions adapted under license by your healthcare professional. If you have questions about a medical condition or this instruction, always ask your healthcare professional. [place] [address] any warranty or liability for your use of this information. The discharge information has been reviewed with the {PATIENT PARENT GUARDIAN:91715}. Any questions and concerns from the {PATIENT PARENT GUARDIAN:95902} have been addressed. The {PATIENT PARENT GUARDIAN:67731} verbalized understanding. CONTENTS FOUND IN YOUR DISCHARGE ENVELOPE:  [x]     Surgeon and Hospital Discharge Instructions  [x]     [facility] Care Provider Card  []     Medication & Side Effect Guide            (your newly prescribed medications have been marked/highlighted showing the most common side effects from   the classes of drugs on your prescriptions)  []     Medication Prescription(s) x *** ( [x] These have been sent electronically to your pharmacy by your surgeon,   - OR -       your surgeon has already provided these to you during a previous/pre-op office visit)  [x]     [facility]PAR[facility] Education Information  []     Physical Therapy Prescription  [x]     Follow-up Appointment Cards  []     Surgery-related Pictures/Media  [x]     Pain block and/or block with On-Q Catheter from Anesthesia Service (information included in your instructions above)  []     Medical device information sheets/pamphlets from their    []     School/work excuse note. []     /parent work excuse note. The following personal items collected during your admission are returned to you:   Dental Appliance: Dental Appliances: None  Vision: Visual Aid: Glasses,At home  Hearing Aid:    Jewelry: Jabari Tan: Negrita Gonzalez (gave to patients son)  Clothing: Clothing: Other (comment) (street clothes to PACU locker)  Other Valuables:  Other Valuables: None  Valuables sent to safe:

## 2022-04-18 NOTE — OP NOTES
ARTHROSCOPIC ROTATOR CUFF REPAIR OP NOTE       Date of Procedure: 4/11/2022   Preoperative Diagnosis: SUBACROMIAL IMPINGEMENT OF RIGHT SHOULDER, NONTRAUMATIC COMPLETE TEAR OF RIGHT ROTATOR CUFF, OA OF RIGHT GLENOHUMERAL JOINT, ARTHRITIS OF RIGHT ACROMIOCLAVICULAR JOINT  Postoperative Diagnosis: SUBACROMIAL IMPINGEMENT OF RIGHT SHOULDER, NONTRAUMATIC COMPLETE TEAR OF RIGHT ROTATOR CUFF, OA OF RIGHT GLENOHUMERAL JOINT, ARTHRITIS OF RIGHT ACROMIOCLAVICULAR AWILDA    Procedure: Procedure(s):  RIGHT ARTHROSCOPIC  ROTATOR CUFF REPAIR WITH SUBACROMIAL DECOMPRESSION AND DISTAL CLAVICLE EXCISION  Surgeon: Luann Reyna MD  Assistant(s): Osito Velazco PA-C, ATC  Anesthesia: General   Estimated Blood Loss: <50cc  Specimens: * No specimens in log *   Complications: None  Implants:  Implant Name Type Inv. Item Serial No.  Lot No. LRB No. Used Action   ANCHOR SUT JUGGERKNOT 2.9MM --  - [id number] [place] ANCHOR SUT JUGGERKNOT 2.9MM --   [facility] [id number] Right 2 Implanted         INDICATIONS:  Ramona Real  is a 61 y.o., female  who has complained of a long history of shoulder pain. After failure of conservative treatment the patient presents for definitive operative care. DESCRIPTION OF PROCEDURE:  After being informed of the risks and benefits, which include, but are not limited to, bleeding, infection, neurovascular damage, wound complications, pain and stiffness in the shoulder, the patient consented for the procedure. After adequate general anesthesia, the involved shoulder was prepped and draped in a sterile fashion. A standard posterior arthroscopic portal was established and the shoulder was serially inspected. The subscapularis revealed No significant abnormalities. The labrum revealed No significant abnormalities. The humeral head articular surface revealed No significant abnormalities. The glenoid articular surface revealed No significant abnormalities.  The superior rotator cuff revealed a tear involving the supraspinatus only  The posterior rotator cuff revealed No significant abnormalities All intraarticular abnormalities were debrided appropriately and articular cartilage lesions were appropriately stabilized if present. The scope was then placed in the subacromial space where a lateral portal was established and a bursectomy was performed. The tear was debrided and the tuberosity was abraded to a bleeding bed of bone. A superior stab incision was then made and a corkscrew anchor was inserted at the articular margin. Using a Nevaiser portal, the sutures were passed through the medial aspect of the cuff in a mattress fashion. These sutures were then shuttled out of the anterior portal.  A second anchor was placed anterolaterally, the sutures were passed in a simple fashion and then tied down using a modified Santos knot. Subsequent anchors were placed laterally and the sutures were passed and tied in a similar fashion until a water tight lateral repair was completed. The medial sutures were then tied down finalizing the repair. The patient had an impingement lesion and the scope was then placed laterally and a bur posteriorly and a subacromial decompression was performed using the cutting block technique. The distal clavicle was visualized and A distal claviculectomy was performed based on the preoperative history  of AC joint tenderness and well_visualized distal clavicular osteoarthritic changes. A final bursectomy was performed and the scope was removed. The wounds were closed. Sterile dressings were applied and the patient was awakened and taken to the recovery room in stable condition.

## 2022-06-16 LAB
CHOLEST SERPL-MCNC: 196 MG/DL
GLUCOSE SERPL-MCNC: 118 MG/DL (ref 65–100)
HDLC SERPL-MCNC: 53 MG/DL
LDLC SERPL CALC-MCNC: 106.8 MG/DL (ref 0–100)
TRIGL SERPL-MCNC: 181 MG/DL (ref ?–150)

## 2022-06-20 NOTE — PROGRESS NOTES
[address] [address] [facility] [address] [facility], [address] [place] [address]  Ph: [phone]  Fax: [phone]    Discharge Summary 2-15    Patient name: Jacob Lane  : 1965  Provider#: [id number]  Referral source: Eloinadarryn Marshallsay, *      Medical/Treatment Diagnosis: Pain in right shoulder [M25.511]  Impingement syndrome of right shoulder [M75.41]  Other shoulder lesions, right shoulder [M75.81]  Impingement syndrome of left shoulder [M75.42]  Other shoulder lesions, left shoulder [M75.82]  Pain in left shoulder [M25.512]     Prior Hospitalization: see medical history     Comorbidities: See Plan of Care  Prior Level of Function: See Plan of Care  Medications: Verified on Patient Summary List  Start of Care: 22   Onset Date: (see initial plan of care)  Visits from Start of Care: 11  Missed Visits: 1  Certification Period : 22 to 22    Assessment/Summary of care/GOALS:   Pt had R shoulder surgery. The last treatment was on 3/3/22, and so the patient will be discharged at this time. Goals were not re-assessed.   Thank you for the referral.  [facility]  NT  RECOMMENDATIONS:  [x]Discontinue therapy:   []Patient has reached or is progressing toward set goals and is independent with HEP   []Patient is non-compliant or has abdicated   []Due to lack of appreciable progress towards set goals   []Patient was hospitalized or suffered illness that impacted ability to continue therapy   [x]Other: surgery   Aye Fernandez, PT, DPT 2022

## 2022-08-11 ENCOUNTER — TRANSCRIBE ORDER (OUTPATIENT)
Dept: SCHEDULING | Age: 57
End: 2022-08-11

## 2022-08-11 DIAGNOSIS — M79.89 MASS OF SOFT TISSUE OF NECK: Primary | ICD-10-CM

## 2022-09-23 ENCOUNTER — HOSPITAL ENCOUNTER (OUTPATIENT)
Dept: GENERAL RADIOLOGY | Age: 57
Discharge: HOME | End: 2022-09-23
Payer: COMMERCIAL

## 2022-09-23 ENCOUNTER — TRANSCRIBE ORDER (OUTPATIENT)
Dept: REGISTRATION | Age: 57
End: 2022-09-23

## 2022-09-23 DIAGNOSIS — M25.552 LEFT HIP PAIN: Primary | ICD-10-CM

## 2022-09-23 DIAGNOSIS — M25.552 LEFT HIP PAIN: ICD-10-CM

## 2022-09-23 PROCEDURE — 73502 X-RAY EXAM HIP UNI 2-3 VIEWS: CPT

## 2023-04-22 DIAGNOSIS — M79.89 MASS OF SOFT TISSUE OF NECK: Primary | ICD-10-CM

## 2023-05-11 LAB
CHOLEST SERPL-MCNC: 236 MG/DL
EST. AVERAGE GLUCOSE BLD GHB EST-MCNC: 128 MG/DL
GLUCOSE SERPL-MCNC: 136 MG/DL (ref 65–100)
HBA1C MFR BLD: 6.1 % (ref 4–5.6)
HDLC SERPL-MCNC: 51 MG/DL
LDLC SERPL CALC-MCNC: 161 MG/DL (ref 0–100)
TRIGL SERPL-MCNC: 120 MG/DL (ref ?–150)

## 2023-05-20 RX ORDER — SPIRONOLACTONE 25 MG/1
25 TABLET ORAL
COMMUNITY

## 2023-05-20 RX ORDER — PANTOPRAZOLE SODIUM 40 MG/1
40 TABLET, DELAYED RELEASE ORAL DAILY
COMMUNITY

## 2023-05-20 RX ORDER — METOPROLOL SUCCINATE 100 MG/1
100 TABLET, EXTENDED RELEASE ORAL
COMMUNITY

## 2023-05-20 RX ORDER — OLMESARTAN MEDOXOMIL 20 MG/1
20 TABLET ORAL
COMMUNITY

## 2023-06-07 ENCOUNTER — HOSPITAL ENCOUNTER (OUTPATIENT)
Facility: HOSPITAL | Age: 58
Discharge: HOME | End: 2023-06-10
Payer: COMMERCIAL

## 2023-06-07 DIAGNOSIS — Z12.31 VISIT FOR SCREENING MAMMOGRAM: ICD-10-CM

## 2023-06-07 PROCEDURE — 77063 BREAST TOMOSYNTHESIS BI: CPT

## 2023-06-19 LAB
CHOLEST SERPL-MCNC: 236 MG/DL
GLUCOSE SERPL-MCNC: 136 MG/DL (ref 65–100)
HDLC SERPL-MCNC: 51 MG/DL
LDLC SERPL CALC-MCNC: 161 MG/DL (ref 0–100)
TRIGL SERPL-MCNC: 120 MG/DL

## 2024-07-10 LAB
CHOLEST SERPL-MCNC: 229 MG/DL
EST. AVERAGE GLUCOSE BLD GHB EST-MCNC: 137 MG/DL
GLUCOSE SERPL-MCNC: 147 MG/DL (ref 65–100)
HBA1C MFR BLD: 6.4 % (ref 4–5.6)
HDLC SERPL-MCNC: 48 MG/DL
LDLC SERPL CALC-MCNC: 156 MG/DL (ref 0–100)
TRIGL SERPL-MCNC: 125 MG/DL

## 2024-09-18 ENCOUNTER — OFFICE VISIT (OUTPATIENT)
Age: 59
End: 2024-09-18
Payer: COMMERCIAL

## 2024-09-18 VITALS
WEIGHT: 184.13 LBS | HEIGHT: 64 IN | SYSTOLIC BLOOD PRESSURE: 132 MMHG | BODY MASS INDEX: 31.44 KG/M2 | DIASTOLIC BLOOD PRESSURE: 77 MMHG

## 2024-09-18 DIAGNOSIS — N95.1 MENOPAUSAL SYNDROME: ICD-10-CM

## 2024-09-18 DIAGNOSIS — Z01.419 GYNECOLOGIC EXAM NORMAL: ICD-10-CM

## 2024-09-18 DIAGNOSIS — Z90.710 HISTORY OF HYSTERECTOMY: ICD-10-CM

## 2024-09-18 DIAGNOSIS — Z12.72 SCREENING FOR VAGINAL CANCER: ICD-10-CM

## 2024-09-18 DIAGNOSIS — Z12.31 SCREENING MAMMOGRAM FOR BREAST CANCER: Primary | ICD-10-CM

## 2024-09-18 PROCEDURE — 99386 PREV VISIT NEW AGE 40-64: CPT | Performed by: OBSTETRICS & GYNECOLOGY

## 2024-09-18 ASSESSMENT — ENCOUNTER SYMPTOMS
GASTROINTESTINAL NEGATIVE: 1
RESPIRATORY NEGATIVE: 1

## 2024-09-24 LAB
., LABCORP: NORMAL
CYTOLOGIST CVX/VAG CYTO: NORMAL
CYTOLOGY CVX/VAG DOC CYTO: NORMAL
CYTOLOGY CVX/VAG DOC THIN PREP: NORMAL
DX ICD CODE: NORMAL
Lab: NORMAL
OTHER STN SPEC: NORMAL
STAT OF ADQ CVX/VAG CYTO-IMP: NORMAL

## 2024-11-12 ENCOUNTER — HOSPITAL ENCOUNTER (OUTPATIENT)
Facility: HOSPITAL | Age: 59
Discharge: HOME | End: 2024-11-15
Payer: COMMERCIAL

## 2024-11-12 ENCOUNTER — TRANSCRIBE ORDERS (OUTPATIENT)
Facility: HOSPITAL | Age: 59
End: 2024-11-12

## 2024-11-12 DIAGNOSIS — Z12.31 VISIT FOR SCREENING MAMMOGRAM: ICD-10-CM

## 2024-11-12 DIAGNOSIS — Z12.31 VISIT FOR SCREENING MAMMOGRAM: Primary | ICD-10-CM

## 2024-11-12 PROCEDURE — 77063 BREAST TOMOSYNTHESIS BI: CPT

## 2025-05-13 LAB
CHOLEST SERPL-MCNC: 194 MG/DL
GLUCOSE SERPL-MCNC: 92 MG/DL (ref 65–100)
HDLC SERPL-MCNC: 50 MG/DL
LDLC SERPL CALC-MCNC: 120.4 MG/DL (ref 0–100)
TRIGL SERPL-MCNC: 118 MG/DL

## (undated) DEVICE — GLOVE ORANGE PI 7 1/2   MSG9075

## (undated) DEVICE — POLYP TRAP: Brand: TRAPEASE®

## (undated) DEVICE — CONTAINER SPEC 20 ML LID NEUT BUFF FORMALIN 10 % POLYPR STS

## (undated) DEVICE — ADULT SPO2 SENSOR: Brand: NELLCOR

## (undated) DEVICE — GLOVE SURG SZ 9 L12IN FNGR THK79MIL GRN LTX FREE

## (undated) DEVICE — KIT INSTR GUID FLX DRL BIT OBT FOR 2.9MM SFT SUT ANCHR

## (undated) DEVICE — GLOVE SURG SZ 9 L12IN FNGR THK126MIL CRM LTX FREE

## (undated) DEVICE — Device

## (undated) DEVICE — NDL PRT INJ NSAF BLNT 18GX1.5 --

## (undated) DEVICE — GRASPER SUT 60DEG SHRP TIP LO PROF FOR RAP ACCS IN SHLDR

## (undated) DEVICE — ARTHROSCOPY-SFMC: Brand: MEDLINE INDUSTRIES, INC.

## (undated) DEVICE — SUT ETHLN 3-0 18IN PS1 BLK --

## (undated) DEVICE — BAG SPEC BIOHZRD 10 X 10 IN --

## (undated) DEVICE — DRSG GZ OIL EMUL CURAD 3X3 --

## (undated) DEVICE — PACK,SHOULDER,DRAPE,POUCH: Brand: MEDLINE

## (undated) DEVICE — BOOT ORTH XL KNEE GRN TYVEK HI CVR SKID RESIST HEAT SEAL E

## (undated) DEVICE — CANNULA CUSH AD W/ 14FT TBG

## (undated) DEVICE — CATH IV AUTOGRD BC BLU 22GA 25 -- INSYTE

## (undated) DEVICE — GLOVE SURG SZ 7 L12IN FNGR THK79MIL GRN LTX FREE

## (undated) DEVICE — SPONGE GZ W4XL4IN COT 12 PLY TYP VII WVN C FLD DSGN

## (undated) DEVICE — KIT COLON W/ 1.1OZ LUB AND 2 END

## (undated) DEVICE — REM POLYHESIVE ADULT PATIENT RETURN ELECTRODE: Brand: VALLEYLAB

## (undated) DEVICE — DYONICS 25 INFLOW TUBE SET, 3 PER BOX

## (undated) DEVICE — SET ADMIN 16ML TBNG L100IN 2 Y INJ SITE IV PIGGY BK DISP

## (undated) DEVICE — 4.5 FULL RADIUS BONECUTTER BLADES,                                    YELLOW, 8000 MAXIMUM RPM, PACKAGED 6                                    PER BOX, STERILE

## (undated) DEVICE — BAG BELONG PT PERS CLEAR HANDL

## (undated) DEVICE — 4-PORT MANIFOLD: Brand: NEPTUNE 2

## (undated) DEVICE — GOWN,BREATHABLE,IMP SLV 3XL AURORA: Brand: MEDLINE

## (undated) DEVICE — BURR 4.5 HELICUT LG MOUTH DSPL DYO PWR: Brand: DYONICS / HELICUT

## (undated) DEVICE — SUPER TURBOVAC 90 INTEGRATED CABLE WAND ICW: Brand: COBLATION

## (undated) DEVICE — 3M™ CUROS™ DISINFECTING CAP FOR NEEDLELESS CONNECTORS 270/CARTON 20 CARTONS/CASE CFF1-270: Brand: CUROS™

## (undated) DEVICE — PAD,ABDOMINAL,5"X9",ST,LF,25/BX: Brand: MEDLINE INDUSTRIES, INC.

## (undated) DEVICE — GRASPER ENDOSCP L15CM 45DEG BLU LO PROF SHRP TIP SUT RETRV

## (undated) DEVICE — SOLIDIFIER MEDC 1200ML -- CONVERT TO 356117

## (undated) DEVICE — SOLUTION IRRIG 3000ML LAC RINGERS ARTHROMTC PLAS CONT

## (undated) DEVICE — TUBING, SUCTION, 1/4" X 10', STRAIGHT: Brand: MEDLINE

## (undated) DEVICE — BITEBLOCK ENDOSCP 60FR MAXI WHT POLYETH STURDY W/ VELC WVN

## (undated) DEVICE — UNIT THER COMB CRYO W/ PD SHLDR W/ TUBE PWR OPERATED W/ BD

## (undated) DEVICE — GLOVE SURG SZ 65 L12IN FNGR THK126MIL CRM LTX FREE

## (undated) DEVICE — 1200 GUARD II KIT W/5MM TUBE W/O VAC TUBE: Brand: GUARDIAN

## (undated) DEVICE — DUAL IRRIGATION ADAPTOR

## (undated) DEVICE — SNARE ENDOSCP M L240CM W27MM SHTH DIA2.4MM CHN 2.8MM OVL

## (undated) DEVICE — CANNULA ARTHSCP L7CM DIA6MM CONIC TIP THRD NONSHIELDED DISP

## (undated) DEVICE — SYR 5ML 1/5 GRAD LL NSAF LF --

## (undated) DEVICE — ELECTRODE,RADIOTRANSLUCENT,FOAM,3PK: Brand: MEDLINE

## (undated) DEVICE — FORCEPS BX L240CM JAW DIA2.8MM L CAP W/ NDL MIC MESH TOOTH

## (undated) DEVICE — KIT SHLDR TRAC CUST